# Patient Record
Sex: FEMALE | Race: WHITE
[De-identification: names, ages, dates, MRNs, and addresses within clinical notes are randomized per-mention and may not be internally consistent; named-entity substitution may affect disease eponyms.]

---

## 2020-05-21 ENCOUNTER — HOSPITAL ENCOUNTER (OUTPATIENT)
Age: 45
End: 2020-05-21
Payer: MEDICAID

## 2020-05-21 DIAGNOSIS — M54.9: Primary | ICD-10-CM

## 2020-05-21 PROCEDURE — 72100 X-RAY EXAM L-S SPINE 2/3 VWS: CPT

## 2023-03-15 PROBLEM — N32.81 OVERACTIVE BLADDER: Status: ACTIVE | Noted: 2023-03-15

## 2023-03-15 PROBLEM — M77.8 RIGHT ELBOW TENDINITIS: Status: ACTIVE | Noted: 2023-03-15

## 2023-03-15 PROBLEM — L30.9 VESICULAR FOOT ECZEMA: Status: ACTIVE | Noted: 2023-03-15

## 2023-03-15 PROBLEM — F41.0 GENERALIZED ANXIETY DISORDER WITH PANIC ATTACKS: Status: ACTIVE | Noted: 2023-03-15

## 2023-03-15 PROBLEM — I65.23 BILATERAL CAROTID ARTERY STENOSIS: Status: ACTIVE | Noted: 2023-03-15

## 2023-03-15 PROBLEM — E61.1 LOW IRON: Status: ACTIVE | Noted: 2023-03-15

## 2023-03-15 PROBLEM — F17.210 CIGARETTE NICOTINE DEPENDENCE: Status: ACTIVE | Noted: 2023-03-15

## 2023-03-15 PROBLEM — G89.29 CHRONIC BACK PAIN: Status: ACTIVE | Noted: 2023-03-15

## 2023-03-15 PROBLEM — E78.00 HYPERCHOLESTEROLEMIA: Status: ACTIVE | Noted: 2023-03-15

## 2023-03-15 PROBLEM — G43.909 MIGRAINE: Status: ACTIVE | Noted: 2023-03-15

## 2023-03-15 PROBLEM — F32.1 DEPRESSION, MAJOR, SINGLE EPISODE, MODERATE (MULTI): Status: ACTIVE | Noted: 2023-03-15

## 2023-03-15 PROBLEM — M51.36 OTHER INTERVERTEBRAL DISC DEGENERATION, LUMBAR REGION: Status: ACTIVE | Noted: 2023-03-15

## 2023-03-15 PROBLEM — M51.369 OTHER INTERVERTEBRAL DISC DEGENERATION, LUMBAR REGION: Status: ACTIVE | Noted: 2023-03-15

## 2023-03-15 PROBLEM — Z86.19 HISTORY OF SHINGLES: Status: ACTIVE | Noted: 2023-03-15

## 2023-03-15 PROBLEM — K21.9 GERD (GASTROESOPHAGEAL REFLUX DISEASE): Status: ACTIVE | Noted: 2023-03-15

## 2023-03-15 PROBLEM — M54.16 RADICULOPATHY, LUMBAR REGION: Status: ACTIVE | Noted: 2023-03-15

## 2023-03-15 PROBLEM — L30.9 HAND ECZEMA: Status: ACTIVE | Noted: 2023-03-15

## 2023-03-15 PROBLEM — M54.9 CHRONIC BACK PAIN: Status: ACTIVE | Noted: 2023-03-15

## 2023-03-15 PROBLEM — K22.70 BARRETT ESOPHAGUS: Status: ACTIVE | Noted: 2023-03-15

## 2023-03-15 PROBLEM — E53.8 FOLATE DEFICIENCY: Status: ACTIVE | Noted: 2023-03-15

## 2023-03-15 PROBLEM — F41.1 GENERALIZED ANXIETY DISORDER WITH PANIC ATTACKS: Status: ACTIVE | Noted: 2023-03-15

## 2023-03-15 PROBLEM — F60.3 BORDERLINE PERSONALITY DISORDER (MULTI): Status: ACTIVE | Noted: 2023-03-15

## 2023-03-15 PROBLEM — E53.8 VITAMIN B12 DEFICIENCY: Status: ACTIVE | Noted: 2023-03-15

## 2023-03-15 PROBLEM — K27.9 PEPTIC ULCER: Status: ACTIVE | Noted: 2023-03-15

## 2023-03-15 RX ORDER — GABAPENTIN 600 MG/1
600 TABLET ORAL
COMMUNITY

## 2023-03-15 RX ORDER — OMEPRAZOLE 40 MG/1
1 CAPSULE, DELAYED RELEASE ORAL DAILY
COMMUNITY
Start: 2020-11-05 | End: 2023-11-03

## 2023-03-15 RX ORDER — OXYBUTYNIN CHLORIDE 10 MG/1
1 TABLET, EXTENDED RELEASE ORAL DAILY
COMMUNITY
Start: 2022-04-07 | End: 2023-03-16 | Stop reason: ALTCHOICE

## 2023-03-15 RX ORDER — BUPROPION HYDROCHLORIDE 150 MG/1
1 TABLET, FILM COATED, EXTENDED RELEASE ORAL EVERY 12 HOURS
COMMUNITY
Start: 2021-02-10 | End: 2023-05-17

## 2023-03-15 RX ORDER — AMITRIPTYLINE HYDROCHLORIDE 50 MG/1
1 TABLET, FILM COATED ORAL NIGHTLY
COMMUNITY
End: 2023-11-07 | Stop reason: SDUPTHER

## 2023-03-15 RX ORDER — LANOLIN ALCOHOL/MO/W.PET/CERES
1 CREAM (GRAM) TOPICAL EVERY OTHER DAY
COMMUNITY
Start: 2019-10-25 | End: 2023-05-04 | Stop reason: SDUPTHER

## 2023-03-15 RX ORDER — VENLAFAXINE HYDROCHLORIDE 150 MG/1
150 CAPSULE, EXTENDED RELEASE ORAL
COMMUNITY
End: 2023-04-18

## 2023-03-15 RX ORDER — CLOTRIMAZOLE AND BETAMETHASONE DIPROPIONATE 10; .64 MG/G; MG/G
1 CREAM TOPICAL 2 TIMES DAILY
COMMUNITY
Start: 2021-12-08

## 2023-03-15 RX ORDER — ATORVASTATIN CALCIUM 20 MG/1
1 TABLET, FILM COATED ORAL NIGHTLY
COMMUNITY
Start: 2022-01-12 | End: 2023-05-04 | Stop reason: ALTCHOICE

## 2023-03-15 RX ORDER — AMMONIUM LACTATE 12 G/100G
LOTION TOPICAL 2 TIMES DAILY
COMMUNITY
Start: 2022-11-03

## 2023-03-15 RX ORDER — FOLIC ACID 1 MG/1
1 TABLET ORAL DAILY
COMMUNITY
Start: 2020-09-02 | End: 2023-05-04 | Stop reason: SDUPTHER

## 2023-03-15 RX ORDER — TIZANIDINE 2 MG/1
2 TABLET ORAL EVERY 8 HOURS
COMMUNITY
End: 2023-09-13 | Stop reason: ALTCHOICE

## 2023-03-15 RX ORDER — FERROUS SULFATE 325(65) MG
1 TABLET ORAL DAILY
COMMUNITY
Start: 2021-02-10 | End: 2023-05-04 | Stop reason: SDUPTHER

## 2023-03-15 RX ORDER — SUMATRIPTAN 50 MG/1
50 TABLET, FILM COATED ORAL ONCE AS NEEDED
COMMUNITY
End: 2023-07-11

## 2023-03-15 RX ORDER — MECLIZINE HYDROCHLORIDE 25 MG/1
1 TABLET ORAL 3 TIMES DAILY PRN
COMMUNITY
Start: 2022-01-13 | End: 2023-07-11

## 2023-03-15 RX ORDER — ALBUTEROL SULFATE 90 UG/1
2 AEROSOL, METERED RESPIRATORY (INHALATION) EVERY 6 HOURS PRN
COMMUNITY
Start: 2019-10-25 | End: 2023-07-11 | Stop reason: SDUPTHER

## 2023-03-15 RX ORDER — ARIPIPRAZOLE 10 MG/1
1 TABLET ORAL DAILY
COMMUNITY
Start: 2020-09-02 | End: 2023-04-13

## 2023-03-16 ENCOUNTER — OFFICE VISIT (OUTPATIENT)
Dept: PRIMARY CARE | Facility: CLINIC | Age: 48
End: 2023-03-16
Payer: COMMERCIAL

## 2023-03-16 VITALS
HEIGHT: 66 IN | WEIGHT: 187 LBS | DIASTOLIC BLOOD PRESSURE: 72 MMHG | HEART RATE: 84 BPM | BODY MASS INDEX: 30.05 KG/M2 | SYSTOLIC BLOOD PRESSURE: 114 MMHG

## 2023-03-16 DIAGNOSIS — R39.11 URINARY HESITANCY: Primary | ICD-10-CM

## 2023-03-16 DIAGNOSIS — L02.92 BOILS: ICD-10-CM

## 2023-03-16 LAB
POC APPEARANCE, URINE: CLEAR
POC BILIRUBIN, URINE: NEGATIVE
POC BLOOD, URINE: NEGATIVE
POC COLOR, URINE: YELLOW
POC GLUCOSE, URINE: NEGATIVE MG/DL
POC KETONES, URINE: NEGATIVE MG/DL
POC LEUKOCYTES, URINE: NEGATIVE
POC NITRITE,URINE: NEGATIVE
POC PH, URINE: 5.5 PH
POC PROTEIN, URINE: NEGATIVE MG/DL
POC SPECIFIC GRAVITY, URINE: >=1.03
POC UROBILINOGEN, URINE: 0.2 EU/DL

## 2023-03-16 PROCEDURE — 87086 URINE CULTURE/COLONY COUNT: CPT

## 2023-03-16 PROCEDURE — 99214 OFFICE O/P EST MOD 30 MIN: CPT | Performed by: NURSE PRACTITIONER

## 2023-03-16 PROCEDURE — 87491 CHLMYD TRACH DNA AMP PROBE: CPT

## 2023-03-16 PROCEDURE — 81003 URINALYSIS AUTO W/O SCOPE: CPT | Performed by: NURSE PRACTITIONER

## 2023-03-16 PROCEDURE — 4004F PT TOBACCO SCREEN RCVD TLK: CPT | Performed by: NURSE PRACTITIONER

## 2023-03-16 RX ORDER — CIPROFLOXACIN 250 MG/1
250 TABLET, FILM COATED ORAL 2 TIMES DAILY
Qty: 14 TABLET | Refills: 0 | Status: SHIPPED | OUTPATIENT
Start: 2023-03-16 | End: 2023-03-16 | Stop reason: ALTCHOICE

## 2023-03-16 RX ORDER — PHENAZOPYRIDINE HYDROCHLORIDE 100 MG/1
100 TABLET, FILM COATED ORAL 3 TIMES DAILY PRN
Qty: 10 TABLET | Refills: 0 | Status: SHIPPED | OUTPATIENT
Start: 2023-03-16 | End: 2023-03-19

## 2023-03-16 RX ORDER — DOXYCYCLINE 100 MG/1
100 TABLET ORAL 2 TIMES DAILY
Qty: 20 TABLET | Refills: 0 | Status: SHIPPED | OUTPATIENT
Start: 2023-03-16 | End: 2023-03-26

## 2023-03-16 ASSESSMENT — ENCOUNTER SYMPTOMS
SPEECH DIFFICULTY: 0
FREQUENCY: 0
NERVOUS/ANXIOUS: 0
CONSTIPATION: 0
UNEXPECTED WEIGHT CHANGE: 0
FATIGUE: 0
NUMBNESS: 0
WOUND: 0
SLEEP DISTURBANCE: 0
TROUBLE SWALLOWING: 0
PALPITATIONS: 0
APNEA: 0
JOINT SWELLING: 0
BACK PAIN: 0
BLOOD IN STOOL: 0
CONFUSION: 0
CHILLS: 0
CHOKING: 0
EYE PAIN: 0
NECK PAIN: 0
DIARRHEA: 0
HEADACHES: 0
SHORTNESS OF BREATH: 0
ARTHRALGIAS: 0
DIZZINESS: 0
PHOTOPHOBIA: 0
CHEST TIGHTNESS: 0
ABDOMINAL DISTENTION: 0
MYALGIAS: 0
VOMITING: 0
ABDOMINAL PAIN: 0
SEIZURES: 0
WHEEZING: 0
SORE THROAT: 0
DIFFICULTY URINATING: 1
LIGHT-HEADEDNESS: 0
DYSURIA: 0
WEAKNESS: 0
FLANK PAIN: 0
FEVER: 0
NAUSEA: 0
EYE REDNESS: 0
HEMATURIA: 0
FACIAL ASYMMETRY: 0
COUGH: 0

## 2023-03-16 ASSESSMENT — PATIENT HEALTH QUESTIONNAIRE - PHQ9
1. LITTLE INTEREST OR PLEASURE IN DOING THINGS: NOT AT ALL
SUM OF ALL RESPONSES TO PHQ9 QUESTIONS 1 AND 2: 0
2. FEELING DOWN, DEPRESSED OR HOPELESS: NOT AT ALL

## 2023-03-16 NOTE — PROGRESS NOTES
"Subjective   Patient ID: Africa Gastelum is a 47 y.o. female who presents for Urinary Problem (C/O TROUBLE URINATING X 2 MONTHS - PROGRESSIVELY GETTING WORSE. TO THE POINT NOW THAT SHE HAS TO PUSH IN ORDER TO URINATE. ).    HPI:  Presents today for C/O URINARY HESITANCY X 2 MONTHS THAT HAS GOTTEN WORSE   modifying factors consists of 2011 SHE DID HAVE RENAL STONES  associated symptoms consist of NO FLANK PAIN OR HEMATURIA  prior treatment consists of medication NONE    C/O MULTIPLE BOILS THAT COMES AND GOES X SEVERAL MONTHS    modifying factors consists of   ER BOYFRIEND ALSO GETS SIMILAR BOILS   associated symptoms consist of  PAINFUL. WILL HEAL ON THERE OWN  prior treatment consists of medication NONE    Visit Vitals  /72   Pulse 84   Ht 1.664 m (5' 5.5\")   Wt 84.8 kg (187 lb)   BMI 30.65 kg/m²   Smoking Status Every Day   BSA 1.98 m²        Review of Systems   Constitutional:  Negative for chills, fatigue, fever and unexpected weight change.   HENT:  Negative for congestion, ear pain, sore throat and trouble swallowing.    Eyes:  Negative for photophobia, pain, redness and visual disturbance.   Respiratory:  Negative for apnea, cough, choking, chest tightness, shortness of breath and wheezing.    Cardiovascular:  Negative for chest pain, palpitations and leg swelling.   Gastrointestinal:  Negative for abdominal distention, abdominal pain, blood in stool, constipation, diarrhea, nausea and vomiting.   Genitourinary:  Positive for difficulty urinating. Negative for dysuria, flank pain, frequency, hematuria and urgency.   Musculoskeletal:  Negative for arthralgias, back pain, gait problem, joint swelling, myalgias and neck pain.   Skin:  Negative for rash and wound.   Neurological:  Negative for dizziness, seizures, syncope, facial asymmetry, speech difficulty, weakness, light-headedness, numbness and headaches.   Psychiatric/Behavioral:  Negative for confusion, sleep disturbance and suicidal ideas. The " patient is not nervous/anxious.        Objective   Results  POCT UA Automated manually resulted (Order [id number])  POCT UA Automated manually resulted  Order: [id number]  Status: Final result       Visible to patient: No (inaccessible in [facility])       Next appt: 05/04/2023 at 07:40 AM in Primary Care (Divina Zepeda, APRN-CNP)       Dx: Urinary hesitancy    0 Result Notes       Component Ref Range & Units 16:55   POC Color, Urine Straw, Yellow, Light Yellow Yellow   POC Appearance, Urine Clear Clear   POC Specific Gravity, Urine 1.005 - 1.035 >=1.030   POC PH, Urine No Reference Range Established PH 5.5   POC Protein, Urine NEGATIVE, 30 (1+) mg/dl NEGATIVE   POC Glucose, Urine NEGATIVE mg/dl NEGATIVE   POC Blood, Urine NEGATIVE NEGATIVE   POC Ketones, Urine NEGATIVE mg/dl NEGATIVE   POC Bilirubin, Urine NEGATIVE NEGATIVE   POC Urobilinogen, Urine 0.2, 1.0 EU/DL 0.2   Poc Nitrate, Urine NEGATIVE NEGATIVE   POC Leukocytes, Urine NEGATIVE NEGATIVE                   Physical Exam  Constitutional:       Appearance: Normal appearance. She is normal weight.   HENT:      Head: Normocephalic.   Eyes:      Extraocular Movements: Extraocular movements intact.      Conjunctiva/sclera: Conjunctivae normal.      Pupils: Pupils are equal, round, and reactive to light.   Cardiovascular:      Rate and Rhythm: Normal rate and regular rhythm.      Pulses: Normal pulses.      Heart sounds: Normal heart sounds.   Pulmonary:      Effort: Pulmonary effort is normal.      Breath sounds: Normal breath sounds.   Abdominal:      General: Bowel sounds are normal.      Palpations: Abdomen is soft.   Musculoskeletal:         General: Normal range of motion.      Cervical back: Normal range of motion.   Skin:     General: Skin is warm and dry.   Neurological:      General: No focal deficit present.      Mental Status: She is alert and oriented to person, place, and time.   Psychiatric:         Mood and Affect: Mood normal.         Behavior:  Behavior normal.         Thought Content: Thought content normal.         Judgment: Judgment normal.            Assessment/Plan   Problem List Items Addressed This Visit          Infectious/Inflammatory    Boils    Relevant Medications    doxycycline (Adoxa) 100 mg tablet       Other    Urinary hesitancy - Primary    Relevant Medications    ciprofloxacin (Cipro) 250 mg tablet    phenazopyridine (Pyridium) 100 mg tablet    Other Relevant Orders    POCT UA Automated manually resulted (Completed)    Urine Culture    US renal complete    Chlamydia Trachomatis, Amplified       WE DISCUSSED MOST COMMON SIDE EFFECTS OF PRESCRIBED MEDICATIONS. INDICATIONS, RISK, COMPLICATIONS, AND ALTERNATIVES OF MEDICATION/THERAPEUTICS WERE EXPLAINED AND DISCUSSED. PLEASE MONITOR CLOSELY FOR ANY UNTOWARD SIDE EFFECTS OR COMPLICATIONS OF MEDICATIONS. PATIENT IS STRONGLY ADVISED TO BE COMPLIANT WITH RECOMMENDATIONS. QUESTIONS AND CONCERNS WERE ADDRESSED. INSTRUCTED TO CALL, RETURN SOONER, OR GO TO THE ER,  IF SYMPTOMS PERSIST OR WORSEN. THEY VOICED UNDERSTANDING AND  DENIES FURTHER QUESTIONS AT THIS TIME.    TIME CODE  1. PREPARATION FOR PATIENT'S VISIT (REVIEWING CHART, CURRENT MEDICAL RECORDS, OUTSIDE HEALTH PROVIDER RECORDS, PREVIOUS HISTORY, EXAM, TEST, PROCEDURE, AND MEDICATIONS)  2. FACE TO FACE ENCOUNTER OBTAINING HISTORY FROM THE PATIENT/FAMILY/CAREGIVERS; PERFORMING EVALUATION AND EXAMINATION; ORDERING TESTS OR PROCEDURES; REFERRING AND COMMUNICATING WITH OTHER HEALTHCARE PROVIDERS; COUNSELING AND EDUCATION OF THE PATIENT/FAMILY/CAREGIVERS; INDEPENDENTLY INTERPRETING RESULTS (TESTS, LABS, PROCEDURES, IMAGING) AND COMMUNICATING AND EXPLAINING RESULTS TO THE PATIENT/FAMILY/CAREGIVERS  3. COORDINATION OF CARE; PREPARING AND PRINTING DISCHARGE INSTRUCTIONS AND ANY EDUCATIONAL MATERIAL FOR THE PATIENT/FAMILY/CAREGIVERS. DOCUMENTING CLINICAL INFORMATION IN THE ELECTRONIC MEDICAL RECORD   4. REVIEWING OARRS AS NEEDED    MDM  1) COMPLEXITY:  MORE THAN 1 STABLE CHRONIC CONDITION ADDRESSED OR 1 ACUTE ILLNESS ADDRESSED   2)DATA: TESTS INTERPRETED AND OR ORDERED, TOOK INDEPENDENT HISTORY OR RECORDS REVIEWED  3)RISK: MODERATE RISK DUE TO NATURE OF MEDICAL CONDITIONS/COMORBIDITY OR MEDICATIONS ORDERED OR SURGICAL OR PROCEDURE REFERRAL    As before per pt request. Renal us

## 2023-03-17 LAB — CHLAMYDIA TRACH., AMPLIFIED: NEGATIVE

## 2023-03-19 LAB — URINE CULTURE: NORMAL

## 2023-04-12 DIAGNOSIS — F32.1 MAJOR DEPRESSIVE DISORDER, SINGLE EPISODE, MODERATE (MULTI): ICD-10-CM

## 2023-04-13 RX ORDER — ARIPIPRAZOLE 10 MG/1
TABLET ORAL
Qty: 90 TABLET | Refills: 1 | Status: SHIPPED | OUTPATIENT
Start: 2023-04-13 | End: 2023-10-11

## 2023-04-16 DIAGNOSIS — F41.9 ANXIETY DISORDER, UNSPECIFIED: ICD-10-CM

## 2023-04-16 DIAGNOSIS — F32.A DEPRESSION, UNSPECIFIED: ICD-10-CM

## 2023-04-18 RX ORDER — VENLAFAXINE HYDROCHLORIDE 150 MG/1
CAPSULE, EXTENDED RELEASE ORAL
Qty: 90 CAPSULE | Refills: 1 | Status: SHIPPED | OUTPATIENT
Start: 2023-04-18 | End: 2023-08-01

## 2023-05-02 LAB
ALANINE AMINOTRANSFERASE (SGPT) (U/L) IN SER/PLAS: 14 U/L (ref 7–45)
ALBUMIN (G/DL) IN SER/PLAS: 3.9 G/DL (ref 3.4–5)
ALKALINE PHOSPHATASE (U/L) IN SER/PLAS: 107 U/L (ref 33–110)
ANION GAP IN SER/PLAS: 9 MMOL/L (ref 10–20)
ASPARTATE AMINOTRANSFERASE (SGOT) (U/L) IN SER/PLAS: 15 U/L (ref 9–39)
BASOPHILS (10*3/UL) IN BLOOD BY AUTOMATED COUNT: 0.04 X10E9/L (ref 0–0.1)
BASOPHILS/100 LEUKOCYTES IN BLOOD BY AUTOMATED COUNT: 0.6 % (ref 0–2)
BILIRUBIN TOTAL (MG/DL) IN SER/PLAS: 0.6 MG/DL (ref 0–1.2)
CALCIDIOL (25 OH VITAMIN D3) (NG/ML) IN SER/PLAS: 16 NG/ML
CALCIUM (MG/DL) IN SER/PLAS: 9 MG/DL (ref 8.6–10.3)
CARBON DIOXIDE, TOTAL (MMOL/L) IN SER/PLAS: 28 MMOL/L (ref 21–32)
CHLORIDE (MMOL/L) IN SER/PLAS: 103 MMOL/L (ref 98–107)
CHOLESTEROL (MG/DL) IN SER/PLAS: 216 MG/DL (ref 0–199)
CHOLESTEROL IN HDL (MG/DL) IN SER/PLAS: 35 MG/DL
CHOLESTEROL/HDL RATIO: 6.2
COBALAMIN (VITAMIN B12) (PG/ML) IN SER/PLAS: 330 PG/ML (ref 211–911)
CREATININE (MG/DL) IN SER/PLAS: 1.04 MG/DL (ref 0.5–1.05)
EOSINOPHILS (10*3/UL) IN BLOOD BY AUTOMATED COUNT: 0.11 X10E9/L (ref 0–0.7)
EOSINOPHILS/100 LEUKOCYTES IN BLOOD BY AUTOMATED COUNT: 1.6 % (ref 0–6)
ERYTHROCYTE DISTRIBUTION WIDTH (RATIO) BY AUTOMATED COUNT: 13.2 % (ref 11.5–14.5)
ERYTHROCYTE MEAN CORPUSCULAR HEMOGLOBIN CONCENTRATION (G/DL) BY AUTOMATED: 33.1 G/DL (ref 32–36)
ERYTHROCYTE MEAN CORPUSCULAR VOLUME (FL) BY AUTOMATED COUNT: 92 FL (ref 80–100)
ERYTHROCYTES (10*6/UL) IN BLOOD BY AUTOMATED COUNT: 5.48 X10E12/L (ref 4–5.2)
ESTIMATED AVERAGE GLUCOSE FOR HBA1C: 97 MG/DL
FERRITIN (UG/LL) IN SER/PLAS: 146 UG/L (ref 8–150)
FOLATE (NG/ML) IN SER/PLAS: >22.3 NG/ML
GFR FEMALE: 67 ML/MIN/1.73M2
GLUCOSE (MG/DL) IN SER/PLAS: 91 MG/DL (ref 74–99)
HEMATOCRIT (%) IN BLOOD BY AUTOMATED COUNT: 50.2 % (ref 36–46)
HEMOGLOBIN (G/DL) IN BLOOD: 16.6 G/DL (ref 12–16)
HEMOGLOBIN A1C/HEMOGLOBIN TOTAL IN BLOOD: 5 %
IMMATURE GRANULOCYTES/100 LEUKOCYTES IN BLOOD BY AUTOMATED COUNT: 0.4 % (ref 0–0.9)
IRON (UG/DL) IN SER/PLAS: 112 UG/DL (ref 35–150)
IRON BINDING CAPACITY (UG/DL) IN SER/PLAS: 256 UG/DL (ref 240–445)
IRON SATURATION (%) IN SER/PLAS: 44 % (ref 25–45)
LDL: 149 MG/DL (ref 0–99)
LEUKOCYTES (10*3/UL) IN BLOOD BY AUTOMATED COUNT: 6.9 X10E9/L (ref 4.4–11.3)
LYMPHOCYTES (10*3/UL) IN BLOOD BY AUTOMATED COUNT: 1.81 X10E9/L (ref 1.2–4.8)
LYMPHOCYTES/100 LEUKOCYTES IN BLOOD BY AUTOMATED COUNT: 26.4 % (ref 13–44)
MONOCYTES (10*3/UL) IN BLOOD BY AUTOMATED COUNT: 0.41 X10E9/L (ref 0.1–1)
MONOCYTES/100 LEUKOCYTES IN BLOOD BY AUTOMATED COUNT: 6 % (ref 2–10)
NEUTROPHILS (10*3/UL) IN BLOOD BY AUTOMATED COUNT: 4.46 X10E9/L (ref 1.2–7.7)
NEUTROPHILS/100 LEUKOCYTES IN BLOOD BY AUTOMATED COUNT: 65 % (ref 40–80)
PLATELETS (10*3/UL) IN BLOOD AUTOMATED COUNT: 143 X10E9/L (ref 150–450)
POTASSIUM (MMOL/L) IN SER/PLAS: 3.7 MMOL/L (ref 3.5–5.3)
PROTEIN TOTAL: 7 G/DL (ref 6.4–8.2)
SODIUM (MMOL/L) IN SER/PLAS: 136 MMOL/L (ref 136–145)
THYROTROPIN (MIU/L) IN SER/PLAS BY DETECTION LIMIT <= 0.05 MIU/L: 1.97 MIU/L (ref 0.44–3.98)
TRIGLYCERIDE (MG/DL) IN SER/PLAS: 162 MG/DL (ref 0–149)
UREA NITROGEN (MG/DL) IN SER/PLAS: 10 MG/DL (ref 6–23)
VLDL: 32 MG/DL (ref 0–40)

## 2023-05-04 ENCOUNTER — OFFICE VISIT (OUTPATIENT)
Dept: PRIMARY CARE | Facility: CLINIC | Age: 48
End: 2023-05-04
Payer: COMMERCIAL

## 2023-05-04 VITALS
WEIGHT: 184 LBS | HEART RATE: 77 BPM | DIASTOLIC BLOOD PRESSURE: 81 MMHG | HEIGHT: 65 IN | BODY MASS INDEX: 30.66 KG/M2 | SYSTOLIC BLOOD PRESSURE: 123 MMHG

## 2023-05-04 DIAGNOSIS — F32.1 DEPRESSION, MAJOR, SINGLE EPISODE, MODERATE (MULTI): ICD-10-CM

## 2023-05-04 DIAGNOSIS — Z12.11 COLON CANCER SCREENING: ICD-10-CM

## 2023-05-04 DIAGNOSIS — E55.9 VITAMIN D DEFICIENCY: ICD-10-CM

## 2023-05-04 DIAGNOSIS — F41.0 GENERALIZED ANXIETY DISORDER WITH PANIC ATTACKS: ICD-10-CM

## 2023-05-04 DIAGNOSIS — E78.00 HYPERCHOLESTEROLEMIA: Primary | ICD-10-CM

## 2023-05-04 DIAGNOSIS — E61.1 LOW IRON: ICD-10-CM

## 2023-05-04 DIAGNOSIS — E53.8 FOLATE DEFICIENCY: ICD-10-CM

## 2023-05-04 DIAGNOSIS — N18.2 CKD (CHRONIC KIDNEY DISEASE) STAGE 2, GFR 60-89 ML/MIN: ICD-10-CM

## 2023-05-04 DIAGNOSIS — F41.1 GENERALIZED ANXIETY DISORDER WITH PANIC ATTACKS: ICD-10-CM

## 2023-05-04 DIAGNOSIS — N32.81 OVERACTIVE BLADDER: ICD-10-CM

## 2023-05-04 DIAGNOSIS — E53.8 VITAMIN B12 DEFICIENCY: ICD-10-CM

## 2023-05-04 DIAGNOSIS — K22.719 BARRETT'S ESOPHAGUS WITH DYSPLASIA: ICD-10-CM

## 2023-05-04 DIAGNOSIS — K59.00 CONSTIPATION, UNSPECIFIED CONSTIPATION TYPE: ICD-10-CM

## 2023-05-04 PROCEDURE — 4004F PT TOBACCO SCREEN RCVD TLK: CPT | Performed by: NURSE PRACTITIONER

## 2023-05-04 PROCEDURE — 99214 OFFICE O/P EST MOD 30 MIN: CPT | Performed by: NURSE PRACTITIONER

## 2023-05-04 RX ORDER — ERGOCALCIFEROL 1.25 MG/1
50000 CAPSULE ORAL
Qty: 13 CAPSULE | Refills: 3 | Status: SHIPPED | OUTPATIENT
Start: 2023-05-04 | End: 2024-05-03

## 2023-05-04 RX ORDER — PANTOPRAZOLE SODIUM 40 MG/1
40 TABLET, DELAYED RELEASE ORAL
COMMUNITY
Start: 2017-07-24 | End: 2023-11-07 | Stop reason: SDUPTHER

## 2023-05-04 RX ORDER — FERROUS SULFATE 325(65) MG
1 TABLET ORAL DAILY
Qty: 90 TABLET | Refills: 3 | Status: SHIPPED | OUTPATIENT
Start: 2023-05-04 | End: 2024-05-28 | Stop reason: SDUPTHER

## 2023-05-04 RX ORDER — OXYBUTYNIN CHLORIDE 10 MG/1
10 TABLET, EXTENDED RELEASE ORAL DAILY
COMMUNITY
Start: 2023-04-17 | End: 2023-05-04 | Stop reason: SDUPTHER

## 2023-05-04 RX ORDER — DOCUSATE SODIUM 100 MG/1
100 CAPSULE, LIQUID FILLED ORAL 3 TIMES DAILY PRN
Qty: 90 CAPSULE | Refills: 3 | Status: SHIPPED | OUTPATIENT
Start: 2023-05-04 | End: 2023-09-13

## 2023-05-04 RX ORDER — BISACODYL 5 MG
TABLET, DELAYED RELEASE (ENTERIC COATED) ORAL
Qty: 30 TABLET | Refills: 0 | Status: SHIPPED | OUTPATIENT
Start: 2023-05-04 | End: 2023-11-07 | Stop reason: ALTCHOICE

## 2023-05-04 RX ORDER — FERROUS SULFATE, DRIED 160(50) MG
1 TABLET, EXTENDED RELEASE ORAL 2 TIMES DAILY
Qty: 180 TABLET | Refills: 3 | Status: SHIPPED | OUTPATIENT
Start: 2023-05-04 | End: 2024-05-28 | Stop reason: SDUPTHER

## 2023-05-04 RX ORDER — ATORVASTATIN CALCIUM 40 MG/1
40 TABLET, FILM COATED ORAL DAILY
Qty: 90 TABLET | Refills: 3 | Status: SHIPPED | OUTPATIENT
Start: 2023-05-04 | End: 2023-11-07 | Stop reason: SDUPTHER

## 2023-05-04 RX ORDER — POLYETHYLENE GLYCOL 3350 17 G/17G
POWDER, FOR SOLUTION ORAL
Qty: 51 G | Refills: 0 | Status: SHIPPED | OUTPATIENT
Start: 2023-05-04 | End: 2023-11-07 | Stop reason: SDUPTHER

## 2023-05-04 RX ORDER — OXYBUTYNIN CHLORIDE 10 MG/1
10 TABLET, EXTENDED RELEASE ORAL DAILY
Qty: 90 TABLET | Refills: 3 | Status: SHIPPED | OUTPATIENT
Start: 2023-05-04 | End: 2023-09-28 | Stop reason: ALTCHOICE

## 2023-05-04 RX ORDER — LANOLIN ALCOHOL/MO/W.PET/CERES
1000 CREAM (GRAM) TOPICAL EVERY OTHER DAY
Qty: 90 TABLET | Refills: 3 | Status: SHIPPED | OUTPATIENT
Start: 2023-05-04 | End: 2024-05-28 | Stop reason: SDUPTHER

## 2023-05-04 RX ORDER — FOLIC ACID 1 MG/1
1 TABLET ORAL DAILY
Qty: 90 TABLET | Refills: 3 | Status: SHIPPED | OUTPATIENT
Start: 2023-05-04 | End: 2024-05-28 | Stop reason: SDUPTHER

## 2023-05-04 ASSESSMENT — PATIENT HEALTH QUESTIONNAIRE - PHQ9
4. FEELING TIRED OR HAVING LITTLE ENERGY: NEARLY EVERY DAY
3. TROUBLE FALLING OR STAYING ASLEEP OR SLEEPING TOO MUCH: NEARLY EVERY DAY
10. IF YOU CHECKED OFF ANY PROBLEMS, HOW DIFFICULT HAVE THESE PROBLEMS MADE IT FOR YOU TO DO YOUR WORK, TAKE CARE OF THINGS AT HOME, OR GET ALONG WITH OTHER PEOPLE: VERY DIFFICULT
2. FEELING DOWN, DEPRESSED OR HOPELESS: NEARLY EVERY DAY
5. POOR APPETITE OR OVEREATING: NEARLY EVERY DAY
7. TROUBLE CONCENTRATING ON THINGS, SUCH AS READING THE NEWSPAPER OR WATCHING TELEVISION: NEARLY EVERY DAY
1. LITTLE INTEREST OR PLEASURE IN DOING THINGS: NEARLY EVERY DAY
8. MOVING OR SPEAKING SO SLOWLY THAT OTHER PEOPLE COULD HAVE NOTICED. OR THE OPPOSITE, BEING SO FIGETY OR RESTLESS THAT YOU HAVE BEEN MOVING AROUND A LOT MORE THAN USUAL: NOT AT ALL
6. FEELING BAD ABOUT YOURSELF - OR THAT YOU ARE A FAILURE OR HAVE LET YOURSELF OR YOUR FAMILY DOWN: NEARLY EVERY DAY
SUM OF ALL RESPONSES TO PHQ QUESTIONS 1-9: 21
9. THOUGHTS THAT YOU WOULD BE BETTER OFF DEAD, OR OF HURTING YOURSELF: NOT AT ALL
SUM OF ALL RESPONSES TO PHQ9 QUESTIONS 1 AND 2: 6

## 2023-05-04 ASSESSMENT — ENCOUNTER SYMPTOMS
EYE PAIN: 0
DYSURIA: 0
PHOTOPHOBIA: 0
TROUBLE SWALLOWING: 0
CHOKING: 0
NECK PAIN: 0
DIARRHEA: 0
HEMATURIA: 0
WHEEZING: 0
CHILLS: 0
FATIGUE: 0
SEIZURES: 0
DIZZINESS: 0
EYE REDNESS: 0
JOINT SWELLING: 0
CHEST TIGHTNESS: 0
CONSTIPATION: 0
SPEECH DIFFICULTY: 0
COUGH: 0
PALPITATIONS: 0
HEADACHES: 0
NAUSEA: 0
MYALGIAS: 0
NUMBNESS: 0
FREQUENCY: 0
FACIAL ASYMMETRY: 0
FEVER: 0
DIFFICULTY URINATING: 0
FLANK PAIN: 0
ABDOMINAL PAIN: 0
VOMITING: 0
APNEA: 0
CONFUSION: 0
SHORTNESS OF BREATH: 0
WOUND: 0
UNEXPECTED WEIGHT CHANGE: 0
SLEEP DISTURBANCE: 0
ABDOMINAL DISTENTION: 0
NERVOUS/ANXIOUS: 0
BLOOD IN STOOL: 0
BACK PAIN: 0
WEAKNESS: 0
ARTHRALGIAS: 0
SORE THROAT: 0

## 2023-05-04 NOTE — PROGRESS NOTES
"Subjective   Patient ID: Africa Gastelum is a 47 y.o. female who presents for Follow-up (6 MO F/U WITH LABS. NO COMPLAINTS).      HPI:  Presents today for 6 MONTH FU WITH [facility] LABS. C/O CONSTIPATION X SEVERAL MONTHS  modifying factors consists of ON IRON associated symptoms consist of AT TIMES SHE WILL NOT HAVE A BM FOR 2 WEEKS. ABD PAIN ON/OFF  prior treatment consists of medication NONE      VIT D- LOW. START CALCIUM WITH VIT D AND WEEKLY VIT D  OVERACTIVE BLADDER- STABLE. MED REFILL   B12- STABLE  IRON- STABLE  LIPIDS- INCREASE STATIN TO 40 MG FROM 20 MG  ANXIETY/DEPRESSION- STABLE    Visit Vitals  /81   Pulse 77   Ht 1.651 m (5' 5\")   Wt 83.5 kg (184 lb)   BMI 30.62 kg/m²   Smoking Status Every Day   BSA 1.96 m²        Review of Systems   Constitutional:  Negative for chills, fatigue, fever and unexpected weight change.   HENT:  Negative for congestion, ear pain, sore throat and trouble swallowing.    Eyes:  Negative for photophobia, pain, redness and visual disturbance.   Respiratory:  Negative for apnea, cough, choking, chest tightness, shortness of breath and wheezing.    Cardiovascular:  Negative for chest pain, palpitations and leg swelling.   Gastrointestinal:  Negative for abdominal distention, abdominal pain, blood in stool, constipation, diarrhea, nausea and vomiting.   Genitourinary:  Negative for difficulty urinating, dysuria, flank pain, frequency, hematuria and urgency.   Musculoskeletal:  Negative for arthralgias, back pain, gait problem, joint swelling, myalgias and neck pain.   Skin:  Negative for rash and wound.   Neurological:  Negative for dizziness, seizures, syncope, facial asymmetry, speech difficulty, weakness, numbness and headaches.   Psychiatric/Behavioral:  Negative for confusion, sleep disturbance and suicidal ideas. The patient is not nervous/anxious.        Objective   Component      Latest Ref Rng 5/2/2023   WBC      4.4 - 11.3 x10E9/L 6.9    RBC      4.00 - 5.20 x10E12/L " 5.48 (H)    HEMOGLOBIN      12.0 - 16.0 g/dL 16.6 (H)    HEMATOCRIT      36.0 - 46.0 % 50.2 (H)    MCV      80 - 100 fL 92    MCHC      32.0 - 36.0 g/dL 33.1    Platelets      150 - 450 x10E9/L 143 (L)    RED CELL DISTRIBUTION WIDTH      11.5 - 14.5 % 13.2    Neutrophils %      40.0 - 80.0 % 65.0    Immature Granulocytes %, Automated      0.0 - 0.9 % 0.4    Lymphocytes %      13.0 - 44.0 % 26.4    Monocytes %      2.0 - 10.0 % 6.0    Eosinophils %      0.0 - 6.0 % 1.6    Basophils %      0.0 - 2.0 % 0.6    Neutrophils Absolute      1.20 - 7.70 x10E9/L 4.46    Lymphocytes Absolute      1.20 - 4.80 x10E9/L 1.81    Monocytes Absolute      0.10 - 1.00 x10E9/L 0.41    Eosinophils Absolute      0.00 - 0.70 x10E9/L 0.11    Basophils Absolute      0.00 - 0.10 x10E9/L 0.04    GLUCOSE      74 - 99 mg/dL 91    SODIUM      136 - 145 mmol/L 136    POTASSIUM      3.5 - 5.3 mmol/L 3.7    CHLORIDE      98 - 107 mmol/L 103    Bicarbonate      21 - 32 mmol/L 28    Anion Gap      10 - 20 mmol/L 9 (L)    Blood Urea Nitrogen      6 - 23 mg/dL 10    Creatinine      0.50 - 1.05 mg/dL 1.04    GFR Female      >90 mL/min/1.73m2 67    Calcium      8.6 - 10.3 mg/dL 9.0    Albumin      3.4 - 5.0 g/dL 3.9    Alkaline Phosphatase      33 - 110 U/L 107    Total Protein      6.4 - 8.2 g/dL 7.0    AST      9 - 39 U/L 15    Bilirubin Total      0.0 - 1.2 mg/dL 0.6    ALT      7 - 45 U/L 14    CHOLESTEROL      0 - 199 mg/dL 216 (H)    HDL CHOLESTEROL      mg/dL 35.0 !    Cholesterol/HDL Ratio 6.2 !    LDL      0 - 99 mg/dL 149 (H)    VLDL      0 - 40 mg/dL 32    TRIGLYCERIDES      0 - 149 mg/dL 162 (H)    IRON      35 - 150 ug/dL 112    TIBC      240 - 445 ug/dL 256    % Saturation      25 - 45 % 44    Hemoglobin A1C      % 5.0    Estimated Average Glucose      MG/DL 97    FOLATE      >5.0 ng/mL >22.3    FERRITIN      8 - 150 ug/L 146    Vitamin B12      211 - 911 pg/mL 330    Thyroid Stimulating Hormone      0.44 - 3.98 mIU/L 1.97    Vitamin D,  25-Hydroxy, Total      ng/mL 16 !       Legend:  (H) High  (L) Low  ! Abnormal  Physical Exam  Constitutional:       Appearance: Normal appearance. She is normal weight.   HENT:      Head: Normocephalic.   Eyes:      Extraocular Movements: Extraocular movements intact.      Conjunctiva/sclera: Conjunctivae normal.      Pupils: Pupils are equal, round, and reactive to light.   Cardiovascular:      Rate and Rhythm: Normal rate and regular rhythm.      Pulses: Normal pulses.      Heart sounds: Normal heart sounds.   Pulmonary:      Effort: Pulmonary effort is normal.      Breath sounds: Normal breath sounds.   Musculoskeletal:         General: Normal range of motion.      Cervical back: Normal range of motion.   Skin:     General: Skin is warm and dry.   Neurological:      General: No focal deficit present.      Mental Status: She is alert and oriented to person, place, and time.   Psychiatric:         Mood and Affect: Mood normal.         Behavior: Behavior normal.         Thought Content: Thought content normal.         Judgment: Judgment normal.            Assessment/Plan   Problem List Items Addressed This Visit          Digestive    Low esophagus    Constipation    Relevant Medications    docusate sodium (Colace) 100 mg capsule       Genitourinary    Overactive bladder    Relevant Medications    oxybutynin XL (Ditropan-XL) 10 mg 24 hr tablet       Endocrine/Metabolic    Folate deficiency    Relevant Medications    folic acid (Folvite) 1 mg tablet    Other Relevant Orders    Folate    Vitamin B12 deficiency    Relevant Medications    cyanocobalamin (Vitamin B-12) 1,000 mcg tablet    Other Relevant Orders    Vitamin B12    Vitamin D deficiency    Relevant Medications    calcium carbonate-vitamin D3 (Hi-Alek Plus Vit D) 500 mg-5 mcg (200 unit) tablet    ergocalciferol (Vitamin D-2) 1.25 MG (18279 UT) capsule    Other Relevant Orders    Parathyroid Hormone, Intact    Vitamin D, Total       Other    Depression,  major, single episode, moderate (CMS/HCC)    Generalized anxiety disorder with panic attacks    Hypercholesterolemia - Primary    Relevant Medications    atorvastatin (Lipitor) 40 mg tablet    bisacodyl (Dulcolax, bisacodyl,) 5 mg EC tablet    polyethylene glycol (Miralax) 17 gram/dose powder    Other Relevant Orders    Hemoglobin A1C    TSH with reflex to Free T4 if abnormal    Low iron    Relevant Medications    ferrous sulfate 325 (65 Fe) MG tablet    Other Relevant Orders    Ferritin    Iron and TIBC    Colon cancer screening    Relevant Orders    Cologuard® colon cancer screening     Other Visit Diagnoses       CKD (chronic kidney disease) stage 2, GFR 60-89 ml/min        Relevant Orders    CBC and Auto Differential    Comprehensive Metabolic Panel            WE DISCUSSED MOST COMMON SIDE EFFECTS OF PRESCRIBED MEDICATIONS. INDICATIONS, RISK, COMPLICATIONS, AND ALTERNATIVES OF MEDICATION/THERAPEUTICS WERE EXPLAINED AND DISCUSSED. PLEASE MONITOR CLOSELY FOR ANY UNTOWARD SIDE EFFECTS OR COMPLICATIONS OF MEDICATIONS. PATIENT IS STRONGLY ADVISED TO BE COMPLIANT WITH RECOMMENDATIONS. QUESTIONS AND CONCERNS WERE ADDRESSED. INSTRUCTED TO CALL, RETURN SOONER, OR GO TO THE ER,  IF SYMPTOMS PERSIST OR WORSEN. THEY VOICED UNDERSTANDING AND  DENIES FURTHER QUESTIONS AT THIS TIME.    TIME CODE  1. PREPARATION FOR PATIENT'S VISIT (REVIEWING CHART, CURRENT MEDICAL RECORDS, OUTSIDE HEALTH PROVIDER RECORDS, PREVIOUS HISTORY, EXAM, TEST, PROCEDURE, AND MEDICATIONS)  2. FACE TO FACE ENCOUNTER OBTAINING HISTORY FROM THE PATIENT/FAMILY/CAREGIVERS; PERFORMING EVALUATION AND EXAMINATION; ORDERING TESTS OR PROCEDURES; REFERRING AND COMMUNICATING WITH OTHER HEALTHCARE PROVIDERS; COUNSELING AND EDUCATION OF THE PATIENT/FAMILY/CAREGIVERS; INDEPENDENTLY INTERPRETING RESULTS (TESTS, LABS, PROCEDURES, IMAGING) AND COMMUNICATING AND EXPLAINING RESULTS TO THE PATIENT/FAMILY/CAREGIVERS  3. COORDINATION OF CARE; PREPARING AND PRINTING DISCHARGE  INSTRUCTIONS AND ANY EDUCATIONAL MATERIAL FOR THE PATIENT/FAMILY/CAREGIVERS. DOCUMENTING CLINICAL INFORMATION IN THE ELECTRONIC MEDICAL RECORD   4. REVIEWING OARRS AS NEEDED    MDM  1) COMPLEXITY: MORE THAN 1 STABLE CHRONIC CONDITION ADDRESSED OR 1 ACUTE ILLNESS ADDRESSED   2)DATA: TESTS INTERPRETED AND OR ORDERED, TOOK INDEPENDENT HISTORY OR RECORDS REVIEWED  3)RISK: MODERATE RISK DUE TO NATURE OF MEDICAL CONDITIONS/COMORBIDITY OR MEDICATIONS ORDERED OR SURGICAL OR PROCEDURE REFERRAL    6 MONTHS WITH LABS     COLOGUARD

## 2023-05-17 DIAGNOSIS — F17.200 NICOTINE DEPENDENCE, UNSPECIFIED, UNCOMPLICATED: ICD-10-CM

## 2023-05-17 RX ORDER — BUPROPION HYDROCHLORIDE 150 MG/1
TABLET, FILM COATED, EXTENDED RELEASE ORAL
Qty: 60 TABLET | Refills: 5 | Status: SHIPPED | OUTPATIENT
Start: 2023-05-17 | End: 2023-12-14

## 2023-05-31 LAB — NONINV COLON CA DNA+OCC BLD SCRN STL QL: POSITIVE

## 2023-06-01 ENCOUNTER — TELEPHONE (OUTPATIENT)
Dept: PRIMARY CARE | Facility: CLINIC | Age: 48
End: 2023-06-01
Payer: COMMERCIAL

## 2023-06-01 DIAGNOSIS — R19.5 POSITIVE COLORECTAL CANCER SCREENING USING COLOGUARD TEST: Primary | ICD-10-CM

## 2023-06-01 NOTE — TELEPHONE ENCOUNTER
----- Message from RUFINO Santana sent at 6/1/2023  7:55 AM EDT -----  COLOGUARD IS POSITIVE. SHE NEEDS A COLON FOR FURTHER EVALUATION. ORDER IS IN  ----- Message -----  From: [employer] - Lab Results In  Sent: 5/31/2023   4:46 PM EDT  To: RUFINO Santana

## 2023-06-29 ENCOUNTER — TELEPHONE (OUTPATIENT)
Dept: PRIMARY CARE | Facility: CLINIC | Age: 48
End: 2023-06-29
Payer: COMMERCIAL

## 2023-06-29 ENCOUNTER — HOSPITAL ENCOUNTER (OUTPATIENT)
Dept: DATA CONVERSION | Facility: HOSPITAL | Age: 48
End: 2023-06-29
Attending: INTERNAL MEDICINE | Admitting: INTERNAL MEDICINE
Payer: COMMERCIAL

## 2023-06-29 DIAGNOSIS — Z12.11 ENCOUNTER FOR SCREENING FOR MALIGNANT NEOPLASM OF COLON: ICD-10-CM

## 2023-06-29 DIAGNOSIS — D12.5 BENIGN NEOPLASM OF SIGMOID COLON: ICD-10-CM

## 2023-06-29 DIAGNOSIS — D12.2 BENIGN NEOPLASM OF ASCENDING COLON: ICD-10-CM

## 2023-06-29 DIAGNOSIS — R19.5 OTHER FECAL ABNORMALITIES: ICD-10-CM

## 2023-07-06 LAB
COMPLETE PATHOLOGY REPORT: NORMAL
CONVERTED CLINICAL DIAGNOSIS-HISTORY: NORMAL
CONVERTED FINAL DIAGNOSIS: NORMAL
CONVERTED FINAL REPORT PDF LINK TO COPY AND PASTE: NORMAL
CONVERTED GROSS DESCRIPTION: NORMAL

## 2023-07-10 DIAGNOSIS — R06.02 SHORTNESS OF BREATH ON EXERTION: ICD-10-CM

## 2023-07-10 NOTE — TELEPHONE ENCOUNTER
PT HAD A COLONOSCOPY DONE A FEW WEEKS AGO AND SHE CALLED IN WANTING TO KNOW THE RESULTS? PLEASE REVIEW AND ADVISE.

## 2023-07-11 DIAGNOSIS — G43.909 MIGRAINE, UNSPECIFIED, NOT INTRACTABLE, WITHOUT STATUS MIGRAINOSUS: ICD-10-CM

## 2023-07-11 DIAGNOSIS — Z87.898 PERSONAL HISTORY OF OTHER SPECIFIED CONDITIONS: ICD-10-CM

## 2023-07-11 PROBLEM — M54.30 SCIATICA: Status: ACTIVE | Noted: 2023-07-11

## 2023-07-11 PROBLEM — R55 SYNCOPAL EPISODES: Status: ACTIVE | Noted: 2023-07-11

## 2023-07-11 PROBLEM — F32.A DEPRESSION: Status: ACTIVE | Noted: 2023-07-11

## 2023-07-11 RX ORDER — MECLIZINE HYDROCHLORIDE 25 MG/1
TABLET ORAL
Qty: 90 TABLET | Refills: 1 | Status: SHIPPED | OUTPATIENT
Start: 2023-07-11 | End: 2023-09-13

## 2023-07-11 RX ORDER — SUMATRIPTAN 50 MG/1
TABLET, FILM COATED ORAL
Qty: 9 TABLET | Refills: 1 | Status: SHIPPED | OUTPATIENT
Start: 2023-07-11 | End: 2023-09-12

## 2023-07-11 RX ORDER — ALBUTEROL SULFATE 90 UG/1
2 AEROSOL, METERED RESPIRATORY (INHALATION) EVERY 6 HOURS PRN
Qty: 18 G | Refills: 11 | Status: SHIPPED | OUTPATIENT
Start: 2023-07-11

## 2023-08-01 DIAGNOSIS — F32.A DEPRESSION, UNSPECIFIED: ICD-10-CM

## 2023-08-01 DIAGNOSIS — F41.9 ANXIETY DISORDER, UNSPECIFIED: ICD-10-CM

## 2023-08-01 RX ORDER — VENLAFAXINE HYDROCHLORIDE 150 MG/1
CAPSULE, EXTENDED RELEASE ORAL
Qty: 90 CAPSULE | Refills: 1 | Status: SHIPPED | OUTPATIENT
Start: 2023-08-01 | End: 2024-01-15 | Stop reason: SDUPTHER

## 2023-09-11 DIAGNOSIS — F41.9 ANXIETY DISORDER, UNSPECIFIED: ICD-10-CM

## 2023-09-11 DIAGNOSIS — G43.909 MIGRAINE, UNSPECIFIED, NOT INTRACTABLE, WITHOUT STATUS MIGRAINOSUS: ICD-10-CM

## 2023-09-11 DIAGNOSIS — F32.A DEPRESSION, UNSPECIFIED: ICD-10-CM

## 2023-09-12 RX ORDER — ARIPIPRAZOLE 5 MG/1
5 TABLET ORAL DAILY
Qty: 90 TABLET | Refills: 3 | Status: SHIPPED | OUTPATIENT
Start: 2023-09-12 | End: 2023-09-28 | Stop reason: ALTCHOICE

## 2023-09-12 RX ORDER — SUMATRIPTAN 50 MG/1
TABLET, FILM COATED ORAL
Qty: 9 TABLET | Refills: 1 | Status: SHIPPED | OUTPATIENT
Start: 2023-09-12 | End: 2023-09-28 | Stop reason: SDUPTHER

## 2023-09-13 DIAGNOSIS — Z87.898 PERSONAL HISTORY OF OTHER SPECIFIED CONDITIONS: ICD-10-CM

## 2023-09-13 DIAGNOSIS — K59.00 CONSTIPATION, UNSPECIFIED CONSTIPATION TYPE: ICD-10-CM

## 2023-09-13 PROBLEM — L20.81 ATOPIC NEURODERMATITIS: Status: ACTIVE | Noted: 2020-03-23

## 2023-09-13 RX ORDER — DOCUSATE SODIUM 100 MG/1
100 CAPSULE, LIQUID FILLED ORAL 3 TIMES DAILY PRN
Qty: 90 CAPSULE | Refills: 3 | Status: SHIPPED | OUTPATIENT
Start: 2023-09-13 | End: 2024-01-15 | Stop reason: SDUPTHER

## 2023-09-13 RX ORDER — TIZANIDINE 4 MG/1
4 TABLET ORAL 3 TIMES DAILY
COMMUNITY
Start: 2023-08-19

## 2023-09-13 RX ORDER — MECLIZINE HYDROCHLORIDE 25 MG/1
TABLET ORAL
Qty: 90 TABLET | Refills: 1 | Status: SHIPPED | OUTPATIENT
Start: 2023-09-13 | End: 2023-11-14 | Stop reason: SDUPTHER

## 2023-09-27 PROBLEM — M89.9 BONE LESION: Status: ACTIVE | Noted: 2023-09-27

## 2023-09-27 PROBLEM — S93.401A RIGHT ANKLE SPRAIN: Status: ACTIVE | Noted: 2023-09-27

## 2023-09-28 ENCOUNTER — TELEPHONE (OUTPATIENT)
Dept: PRIMARY CARE | Facility: CLINIC | Age: 48
End: 2023-09-28

## 2023-09-28 ENCOUNTER — OFFICE VISIT (OUTPATIENT)
Dept: PRIMARY CARE | Facility: CLINIC | Age: 48
End: 2023-09-28
Payer: COMMERCIAL

## 2023-09-28 VITALS
DIASTOLIC BLOOD PRESSURE: 77 MMHG | HEIGHT: 66 IN | WEIGHT: 176 LBS | HEART RATE: 87 BPM | SYSTOLIC BLOOD PRESSURE: 111 MMHG | OXYGEN SATURATION: 96 % | BODY MASS INDEX: 28.28 KG/M2

## 2023-09-28 DIAGNOSIS — G43.909 MIGRAINE, UNSPECIFIED, NOT INTRACTABLE, WITHOUT STATUS MIGRAINOSUS: ICD-10-CM

## 2023-09-28 DIAGNOSIS — N32.81 OVERACTIVE BLADDER: ICD-10-CM

## 2023-09-28 DIAGNOSIS — M25.571 ACUTE RIGHT ANKLE PAIN: Primary | ICD-10-CM

## 2023-09-28 PROCEDURE — 99214 OFFICE O/P EST MOD 30 MIN: CPT | Performed by: NURSE PRACTITIONER

## 2023-09-28 PROCEDURE — 4004F PT TOBACCO SCREEN RCVD TLK: CPT | Performed by: NURSE PRACTITIONER

## 2023-09-28 RX ORDER — MIRABEGRON 25 MG/1
25 TABLET, FILM COATED, EXTENDED RELEASE ORAL DAILY
Qty: 90 TABLET | Refills: 1 | Status: SHIPPED | OUTPATIENT
Start: 2023-09-28 | End: 2023-11-07 | Stop reason: SDUPTHER

## 2023-09-28 RX ORDER — SUMATRIPTAN 50 MG/1
TABLET, FILM COATED ORAL
Qty: 9 TABLET | Refills: 1 | Status: SHIPPED | OUTPATIENT
Start: 2023-09-28 | End: 2023-11-07 | Stop reason: SDUPTHER

## 2023-09-28 RX ORDER — PREDNISONE 10 MG/1
30 TABLET ORAL DAILY
Qty: 15 TABLET | Refills: 0 | Status: SHIPPED | OUTPATIENT
Start: 2023-09-28 | End: 2023-11-07 | Stop reason: ALTCHOICE

## 2023-09-28 RX ORDER — NAPROXEN 500 MG/1
500 TABLET ORAL
Qty: 60 TABLET | Refills: 0 | Status: SHIPPED | OUTPATIENT
Start: 2023-09-28 | End: 2023-12-27

## 2023-09-28 ASSESSMENT — ENCOUNTER SYMPTOMS
FLANK PAIN: 0
HEMATURIA: 0
DIFFICULTY URINATING: 0
TROUBLE SWALLOWING: 0
COUGH: 0
NERVOUS/ANXIOUS: 0
SHORTNESS OF BREATH: 0
CHOKING: 0
ABDOMINAL DISTENTION: 0
BLOOD IN STOOL: 0
ABDOMINAL PAIN: 0
WOUND: 0
PHOTOPHOBIA: 0
NUMBNESS: 0
PALPITATIONS: 0
FATIGUE: 0
APNEA: 0
DIARRHEA: 0
CONFUSION: 0
EYE REDNESS: 0
WEAKNESS: 0
FEVER: 0
ARTHRALGIAS: 1
JOINT SWELLING: 0
WHEEZING: 0
FACIAL ASYMMETRY: 0
MYALGIAS: 0
SPEECH DIFFICULTY: 0
EYE PAIN: 0
UNEXPECTED WEIGHT CHANGE: 0
BACK PAIN: 0
SEIZURES: 0
SLEEP DISTURBANCE: 0
DIZZINESS: 0
HEADACHES: 0
FREQUENCY: 0
CHILLS: 0
DYSURIA: 0
SORE THROAT: 0
NAUSEA: 0
VOMITING: 0
NECK PAIN: 0
CHEST TIGHTNESS: 0
CONSTIPATION: 0

## 2023-09-28 ASSESSMENT — PATIENT HEALTH QUESTIONNAIRE - PHQ9
SUM OF ALL RESPONSES TO PHQ9 QUESTIONS 1 AND 2: 0
1. LITTLE INTEREST OR PLEASURE IN DOING THINGS: NOT AT ALL
2. FEELING DOWN, DEPRESSED OR HOPELESS: NOT AT ALL

## 2023-09-28 NOTE — PROGRESS NOTES
"Subjective   Patient ID: Africa Gastelum is a 47 y.o. female who presents for Ankle Injury (Emergency room follow up).      HPI:  Presents today for C/O RIGHT ANKLE PAIN X 1 WEEK  modifying factors consists of SHE WAS WALKING AND HER ANKLE \"ROLLED\"  associated symptoms consist of PAIN WORSE WHEN WALKING. PAIN COMES AND GOES BUT CONSTANT WHEN WALKING.  prior treatment consists of medication BOOT FROM THE ER       OVERACTIVE BLADDER- OXYBUTYNIN NOT HELPING. WILL TRY MYRBETRIQ. REFUSING REFERRAL AT THIS TIME       Visit Vitals  /77 (BP Location: Left arm, Patient Position: Sitting)   Pulse 87   Ht 1.664 m (5' 5.5\")   Wt 79.8 kg (176 lb)   LMP  (LMP Unknown)   SpO2 96%   BMI 28.84 kg/m²   OB Status Having periods   Smoking Status Every Day   BSA 1.92 m²        Review of Systems   Constitutional:  Negative for chills, fatigue, fever and unexpected weight change.   HENT:  Negative for congestion, ear pain, sore throat and trouble swallowing.    Eyes:  Negative for photophobia, pain, redness and visual disturbance.   Respiratory:  Negative for apnea, cough, choking, chest tightness, shortness of breath and wheezing.    Cardiovascular:  Negative for chest pain, palpitations and leg swelling.   Gastrointestinal:  Negative for abdominal distention, abdominal pain, blood in stool, constipation, diarrhea, nausea and vomiting.   Genitourinary:  Negative for difficulty urinating, dysuria, flank pain, frequency, hematuria and urgency.   Musculoskeletal:  Positive for arthralgias. Negative for back pain, gait problem, joint swelling, myalgias and neck pain.   Skin:  Negative for rash and wound.   Neurological:  Negative for dizziness, seizures, syncope, facial asymmetry, speech difficulty, weakness, numbness and headaches.   Psychiatric/Behavioral:  Negative for confusion, sleep disturbance and suicidal ideas. The patient is not nervous/anxious.        Objective   Study Result    Narrative & Impression   Interpreted By:  " RHINA RUSSELL MD  MRN: [id number]  Patient Name: MELY KEARNEY     STUDY:  ANKLE, COMPLETE, MIN 3 VIEWS; FOOT; COMPLETE, MIN 3 VIEWS;  9/22/2023  6:57 am     INDICATION:  fall with pain .     COMPARISON:  Right ankle series of 12/01/2014.     ACCESSION NUMBER(S):  [id number]; [id number]     ORDERING CLINICIAN:  DENISE ALMENDAREZ     TECHNIQUE:  Right ankle series performed with 3 images. Right foot series  performed with 3 images.     FINDINGS:  There is soft tissue swelling greatest at the lateral and anterior  aspect of the ankle. At the distal aspect of the distal fibular  metaphysis there is a new round approximate 1.2 cm lucent lesion with  sclerotic margin. Adjacent to the medial malleolar tip there is a  small ovoid nonacute appearing ossicle which could be related to an  old avulsion injury. Ankle alignment including the mortise appears  intact. No acute fracture or subluxation is seen.     Right foot alignment appears intact. There is a small ovoid sclerotic  lesion of the 3rd metatarsal distal head most likely a small bone  island. No acute fracture or subluxation is seen.     IMPRESSION:  Lateral soft tissue swelling of the ankle.     New indeterminate round lucent lesion with sclerotic margin of the  distal fibular metaphysis. MRI may be considered for further  characterization.     Small nonacute appearing ossicle near the medial malleolar tip may be  related to old trauma/avulsion injury.     No acute fracture or subluxation.     Physical Exam  Constitutional:       Appearance: Normal appearance. She is normal weight.   HENT:      Head: Normocephalic.   Eyes:      Extraocular Movements: Extraocular movements intact.      Conjunctiva/sclera: Conjunctivae normal.      Pupils: Pupils are equal, round, and reactive to light.   Cardiovascular:      Rate and Rhythm: Normal rate and regular rhythm.      Pulses: Normal pulses.      Heart sounds: Normal heart sounds.   Pulmonary:      Effort: Pulmonary effort  is normal.      Breath sounds: Normal breath sounds.   Musculoskeletal:         General: Normal range of motion.      Cervical back: Normal range of motion.      Comments: RIGHT ANKLE PAIN TO BOTH MEDIAL AND LATERAL ASPECT. LARGE AMOUNT OF BRUISING NOTED    Skin:     General: Skin is warm and dry.   Neurological:      General: No focal deficit present.      Mental Status: She is alert and oriented to person, place, and time.   Psychiatric:         Mood and Affect: Mood normal.         Behavior: Behavior normal.         Thought Content: Thought content normal.         Judgment: Judgment normal.            Assessment/Plan   Problem List Items Addressed This Visit       Right ankle pain - Primary    Relevant Orders    CT ankle right wo IV contrast     Other Visit Diagnoses       Migraine, unspecified, not intractable, without status migrainosus        Relevant Medications    SUMAtriptan (Imitrex) 50 mg tablet            SHE DOES HAVE WHAT SHE BELIEVES TO BE TITANIUM LATOSHA AND SCREWS IN HER LOW BACK BUT % SURE. I WILL ORDER CT VS MRI AND SHE IS GOING TO [facility] THROUGH HER PAST MEDICAL HISTORY TO VERIFY IF MRI IS NEEDED       USE COMPRESSION, ELEVATION, AND ICE TO AFFECTED AREA 20 MINUTES ON/20 MINUTES OFF    WE DISCUSSED MOST COMMON SIDE EFFECTS OF PRESCRIBED MEDICATIONS. INDICATIONS, RISK, COMPLICATIONS, AND ALTERNATIVES OF MEDICATION/THERAPEUTICS WERE EXPLAINED AND DISCUSSED. PLEASE MONITOR CLOSELY FOR ANY UNTOWARD SIDE EFFECTS OR COMPLICATIONS OF MEDICATIONS. PATIENT IS STRONGLY ADVISED TO BE COMPLIANT WITH RECOMMENDATIONS. QUESTIONS AND CONCERNS WERE ADDRESSED. INSTRUCTED TO CALL, RETURN SOONER, OR GO TO THE ER,  IF SYMPTOMS PERSIST OR WORSEN. THEY VOICED UNDERSTANDING AND  DENIES FURTHER QUESTIONS AT THIS TIME.    TIME CODE  1. PREPARATION FOR PATIENT'S VISIT (REVIEWING CHART, CURRENT MEDICAL RECORDS, OUTSIDE HEALTH PROVIDER RECORDS, PREVIOUS HISTORY, EXAM, TEST, PROCEDURE, AND MEDICATIONS)  2. FACE TO FACE  ENCOUNTER OBTAINING HISTORY FROM THE PATIENT/FAMILY/CAREGIVERS; PERFORMING EVALUATION AND EXAMINATION; ORDERING TESTS OR PROCEDURES; REFERRING AND COMMUNICATING WITH OTHER HEALTHCARE PROVIDERS; COUNSELING AND EDUCATION OF THE PATIENT/FAMILY/CAREGIVERS; INDEPENDENTLY INTERPRETING RESULTS (TESTS, LABS, PROCEDURES, IMAGING) AND COMMUNICATING AND EXPLAINING RESULTS TO THE PATIENT/FAMILY/CAREGIVERS  3. COORDINATION OF CARE; PREPARING AND PRINTING DISCHARGE INSTRUCTIONS AND ANY EDUCATIONAL MATERIAL FOR THE PATIENT/FAMILY/CAREGIVERS. DOCUMENTING CLINICAL INFORMATION IN THE ELECTRONIC MEDICAL RECORD   4. REVIEWING OARRS AS NEEDED    MDM  1) COMPLEXITY: MORE THAN 1 STABLE CHRONIC CONDITION ADDRESSED OR 1 ACUTE ILLNESS ADDRESSED   2)DATA: TESTS INTERPRETED AND OR ORDERED, TOOK INDEPENDENT HISTORY OR RECORDS REVIEWED  3)RISK: MODERATE RISK DUE TO NATURE OF MEDICAL CONDITIONS/COMORBIDITY OR MEDICATIONS ORDERED OR SURGICAL OR PROCEDURE REFERRAL    FU AFTER TESTING

## 2023-09-29 VITALS — WEIGHT: 177.91 LBS | BODY MASS INDEX: 29.64 KG/M2 | HEIGHT: 65 IN

## 2023-10-04 ENCOUNTER — APPOINTMENT (OUTPATIENT)
Dept: RADIOLOGY | Facility: HOSPITAL | Age: 48
End: 2023-10-04
Payer: COMMERCIAL

## 2023-10-10 ENCOUNTER — HOSPITAL ENCOUNTER (OUTPATIENT)
Dept: RADIOLOGY | Facility: HOSPITAL | Age: 48
Discharge: HOME | End: 2023-10-10
Payer: COMMERCIAL

## 2023-10-10 DIAGNOSIS — M25.571 ACUTE RIGHT ANKLE PAIN: ICD-10-CM

## 2023-10-10 PROCEDURE — 73700 CT LOWER EXTREMITY W/O DYE: CPT | Mod: RT

## 2023-10-10 PROCEDURE — 73700 CT LOWER EXTREMITY W/O DYE: CPT | Mod: RIGHT SIDE | Performed by: STUDENT IN AN ORGANIZED HEALTH CARE EDUCATION/TRAINING PROGRAM

## 2023-10-11 ENCOUNTER — TELEPHONE (OUTPATIENT)
Dept: PRIMARY CARE | Facility: CLINIC | Age: 48
End: 2023-10-11
Payer: COMMERCIAL

## 2023-10-11 DIAGNOSIS — S92.211A CLOSED DISPLACED FRACTURE OF CUBOID OF RIGHT FOOT, INITIAL ENCOUNTER: Primary | ICD-10-CM

## 2023-10-11 DIAGNOSIS — F32.1 MAJOR DEPRESSIVE DISORDER, SINGLE EPISODE, MODERATE (MULTI): ICD-10-CM

## 2023-10-11 RX ORDER — ARIPIPRAZOLE 10 MG/1
TABLET ORAL
Qty: 90 TABLET | Refills: 1 | Status: SHIPPED | OUTPATIENT
Start: 2023-10-11 | End: 2024-04-05

## 2023-10-11 NOTE — TELEPHONE ENCOUNTER
I have called patient to verify where she would like the referral sent. She is agreeable to have it sent to [facility] in [place], patient aware the foot and ankle office will call her to schedule.

## 2023-10-12 ENCOUNTER — TELEPHONE (OUTPATIENT)
Dept: PRIMARY CARE | Facility: CLINIC | Age: 48
End: 2023-10-12
Payer: COMMERCIAL

## 2023-10-18 RX ORDER — OXYBUTYNIN CHLORIDE 10 MG/1
10 TABLET, EXTENDED RELEASE ORAL
COMMUNITY
Start: 2023-07-17 | End: 2024-05-28 | Stop reason: SDUPTHER

## 2023-10-19 ENCOUNTER — TELEPHONE (OUTPATIENT)
Dept: PRIMARY CARE | Facility: CLINIC | Age: 48
End: 2023-10-19

## 2023-10-19 ENCOUNTER — OFFICE VISIT (OUTPATIENT)
Dept: PRIMARY CARE | Facility: CLINIC | Age: 48
End: 2023-10-19
Payer: COMMERCIAL

## 2023-10-19 VITALS
BODY MASS INDEX: 28.28 KG/M2 | HEART RATE: 82 BPM | HEIGHT: 66 IN | DIASTOLIC BLOOD PRESSURE: 69 MMHG | WEIGHT: 176 LBS | SYSTOLIC BLOOD PRESSURE: 107 MMHG | OXYGEN SATURATION: 95 %

## 2023-10-19 DIAGNOSIS — R93.89 ABNORMAL CT SCAN: ICD-10-CM

## 2023-10-19 DIAGNOSIS — S92.211A CLOSED DISPLACED FRACTURE OF CUBOID OF RIGHT FOOT, INITIAL ENCOUNTER: Primary | ICD-10-CM

## 2023-10-19 DIAGNOSIS — F60.3 BORDERLINE PERSONALITY DISORDER (MULTI): ICD-10-CM

## 2023-10-19 PROCEDURE — 99214 OFFICE O/P EST MOD 30 MIN: CPT | Performed by: NURSE PRACTITIONER

## 2023-10-19 PROCEDURE — 4004F PT TOBACCO SCREEN RCVD TLK: CPT | Performed by: NURSE PRACTITIONER

## 2023-10-19 ASSESSMENT — ENCOUNTER SYMPTOMS
FLANK PAIN: 0
WEAKNESS: 0
SPEECH DIFFICULTY: 0
FATIGUE: 0
CHEST TIGHTNESS: 0
BLOOD IN STOOL: 0
CONSTIPATION: 0
HEADACHES: 0
JOINT SWELLING: 0
DIFFICULTY URINATING: 0
CHILLS: 0
FREQUENCY: 0
CONFUSION: 0
MYALGIAS: 0
ABDOMINAL PAIN: 0
UNEXPECTED WEIGHT CHANGE: 0
HEMATURIA: 0
APNEA: 0
DYSURIA: 0
VOMITING: 0
FEVER: 0
ARTHRALGIAS: 1
NECK PAIN: 0
FACIAL ASYMMETRY: 0
SORE THROAT: 0
TROUBLE SWALLOWING: 0
NUMBNESS: 0
WOUND: 0
EYE REDNESS: 0
DIARRHEA: 0
PHOTOPHOBIA: 0
SEIZURES: 0
COUGH: 0
EYE PAIN: 0
WHEEZING: 0
SHORTNESS OF BREATH: 0
ABDOMINAL DISTENTION: 0
BACK PAIN: 0
NAUSEA: 0
SLEEP DISTURBANCE: 0
NERVOUS/ANXIOUS: 0
CHOKING: 0
PALPITATIONS: 0
DIZZINESS: 0

## 2023-10-19 ASSESSMENT — PATIENT HEALTH QUESTIONNAIRE - PHQ9
SUM OF ALL RESPONSES TO PHQ9 QUESTIONS 1 AND 2: 0
2. FEELING DOWN, DEPRESSED OR HOPELESS: NOT AT ALL
1. LITTLE INTEREST OR PLEASURE IN DOING THINGS: NOT AT ALL

## 2023-10-19 NOTE — PROGRESS NOTES
"Subjective   Patient ID: Africa Gastelum is a 47 y.o. female who presents for Follow-up (After CT).      HPI:  Presents today for FU CT RIGHT FOOT. CT SHOWED FRACTURE AND INCIDENTAL CYSTIC LESION IN FOOT. RECOMMENDS MRI FOR FURTHER EVAL OF FINDINGS. SHE DID SEE PODIATRY THIS WEEK.  SHE STARTS PT ON 10/30/23 AND 11/15/23 FU APPT WITH P[facility].  FOOT PAIN REMAINS. NO NEW COMPLAINTS       Visit Vitals  /69 (BP Location: Right arm, Patient Position: Sitting)   Pulse 82   Ht 1.664 m (5' 5.5\")   Wt 79.8 kg (176 lb)   LMP  (LMP Unknown)   SpO2 95%   BMI 28.84 kg/m²   OB Status Having periods   Smoking Status Every Day   BSA 1.92 m²        Review of Systems   Constitutional:  Negative for chills, fatigue, fever and unexpected weight change.   HENT:  Negative for congestion, ear pain, sore throat and trouble swallowing.    Eyes:  Negative for photophobia, pain, redness and visual disturbance.   Respiratory:  Negative for apnea, cough, choking, chest tightness, shortness of breath and wheezing.    Cardiovascular:  Negative for chest pain, palpitations and leg swelling.   Gastrointestinal:  Negative for abdominal distention, abdominal pain, blood in stool, constipation, diarrhea, nausea and vomiting.   Genitourinary:  Negative for difficulty urinating, dysuria, flank pain, frequency, hematuria and urgency.   Musculoskeletal:  Positive for arthralgias. Negative for back pain, gait problem, joint swelling, myalgias and neck pain.   Skin:  Negative for rash and wound.   Neurological:  Negative for dizziness, seizures, syncope, facial asymmetry, speech difficulty, weakness, numbness and headaches.   Psychiatric/Behavioral:  Negative for confusion, sleep disturbance and suicidal ideas. The patient is not nervous/anxious.        Objective   Study Result    Narrative & Impression   Interpreted By:  Keke Stratton,   STUDY:  CT ANKLE RIGHT WO IV CONTRAST;  10/10/2023 3:23 pm    "   INDICATION:  Signs/Symptoms:ANKLE PAIN S/P INJURY.      COMPARISON:  Right foot radiographs dated 09/22/2022      ACCESSION NUMBER(S):  [id number]      ORDERING CLINICIAN:  RADHA CLARK      TECHNIQUE:  CT imaging of the  right was obtained  without administration of  intravenous contrast medium. Coronal and sagittal reformatted images  were performed. 3D reformatted images were created and reviewed.      FINDINGS:  OSSEOUS STRUCTURES:  There is a minimally displaced linear fracture fragment along the  peripheral aspect of the lateral process of talus (best seen on axial  image 150/292 and coronal image 33/62) concerning for  age-indeterminate osseous avulsion of anterior talofibular ligament.  There is also thin linear minimally displaced fracture fragment  through the peripheral aspect of the distal cuboid bone (as seen on  axial image 255/293). This is also concerning for osseous avulsion.  There is also additional fracture through the peripheral medial  aspect of distal cuboid bone at the level of tarsometatarsal joint  articulation (best seen on axial image 264/293). Otherwise rest of  the osseous structures appear grossly unremarkable. Tibiotalar and  subtalar joint articulations are maintained. Midfoot joint  articulations are maintained. Note made of os peroneus.      There is a well-circumscribed round lucent lesion in the posterior  aspect of the distal fibular styloid process measuring approximately  9 x 8 x 8 mm. This demonstrates sclerotic rim with internal  septations. There is focal cortical defect along the lateral aspect  of this lesion (best seen on axial image 154/293).      SOFT TISSUES:  There is diffuse soft tissue swelling about the lateral aspect of the  ankle. Evaluation of tendons and ligaments is limited due to CT  technique. Within this limitation, tendons appear grossly  unremarkable. Otherwise rest of the soft tissues appear grossly  unremarkable.      IMPRESSION:  1. Findings  concerning for age-indeterminate (likely acute) osseous  avulsion along the lateral aspect of the talus at the expected  attachment of anterior talofibular ligament. An MRI can be performed  for further evaluation.  2. Peripheral minimally displaced subtle acute fractures through  lateral and medial aspect of the distal cuboid as described above.  These are also located at the expected ligamentous attachments and is  concerning for ligamentous avulsion.  3. Incidental note of a 9 x 8 x 8 mm well-circumscribed lytic lesion  in the distal fibular styloid process demonstrating sclerotic rim and  internal septations. This is new compared to prior radiograph dating  back to 2014. There is focal cortical defect along the lateral aspect  of the lesion as described above. This is most likely favored to be  benign with differential considerations include but not limited to an  intraosseous ganglion versus a low-grade chondromatosis lesion like  enchondroma versus cystic changes sequela of prior trauma. However a  six-month radiographic follow-up can be obtained to demonstrate  stability.      MACRO:  Critical Finding:  See findings. Notification was initiated on  10/11/2023 at 10:17 am by  Keke Stratton.  (**-YCF-**) Instructions:      Signed by: Keke Stratton 10/11/2023 10:18 AM  Dictation workstation:   [id number]     Physical Exam  Constitutional:       Appearance: Normal appearance. She is normal weight.   HENT:      Head: Normocephalic.   Eyes:      Extraocular Movements: Extraocular movements intact.      Conjunctiva/sclera: Conjunctivae normal.      Pupils: Pupils are equal, round, and reactive to light.   Cardiovascular:      Rate and Rhythm: Normal rate and regular rhythm.      Pulses: Normal pulses.      Heart sounds: Normal heart sounds.   Pulmonary:      Effort: Pulmonary effort is normal.      Breath sounds: Normal breath sounds.   Musculoskeletal:         General: Normal range of motion.      Cervical  back: Normal range of motion.      Comments: RIGHT FOOT PAIN   Skin:     General: Skin is warm and dry.   Neurological:      General: No focal deficit present.      Mental Status: She is alert and oriented to person, place, and time.   Psychiatric:         Mood and Affect: Mood normal.         Behavior: Behavior normal.         Thought Content: Thought content normal.         Judgment: Judgment normal.            Assessment/Plan   Problem List Items Addressed This Visit       Borderline personality disorder (CMS/HCC)    Closed displaced fracture of cuboid bone of right foot - Primary    Relevant Orders    MR foot right wo IV contrast     Other Visit Diagnoses       Abnormal CT scan        Relevant Orders    MR foot right wo IV contrast            WE DISCUSSED MOST COMMON SIDE EFFECTS OF PRESCRIBED MEDICATIONS. INDICATIONS, RISK, COMPLICATIONS, AND ALTERNATIVES OF MEDICATION/THERAPEUTICS WERE EXPLAINED AND DISCUSSED. PLEASE MONITOR CLOSELY FOR ANY UNTOWARD SIDE EFFECTS OR COMPLICATIONS OF MEDICATIONS. PATIENT IS STRONGLY ADVISED TO BE COMPLIANT WITH RECOMMENDATIONS. QUESTIONS AND CONCERNS WERE ADDRESSED. INSTRUCTED TO CALL, RETURN SOONER, OR GO TO THE ER,  IF SYMPTOMS PERSIST OR WORSEN. THEY VOICED UNDERSTANDING AND  DENIES FURTHER QUESTIONS AT THIS TIME.    TIME CODE  1. PREPARATION FOR PATIENT'S VISIT (REVIEWING CHART, CURRENT MEDICAL RECORDS, OUTSIDE HEALTH PROVIDER RECORDS, PREVIOUS HISTORY, EXAM, TEST, PROCEDURE, AND MEDICATIONS)  2. FACE TO FACE ENCOUNTER OBTAINING HISTORY FROM THE PATIENT/FAMILY/CAREGIVERS; PERFORMING EVALUATION AND EXAMINATION; ORDERING TESTS OR PROCEDURES; REFERRING AND COMMUNICATING WITH OTHER HEALTHCARE PROVIDERS; COUNSELING AND EDUCATION OF THE PATIENT/FAMILY/CAREGIVERS; INDEPENDENTLY INTERPRETING RESULTS (TESTS, LABS, PROCEDURES, IMAGING) AND COMMUNICATING AND EXPLAINING RESULTS TO THE PATIENT/FAMILY/CAREGIVERS  3. COORDINATION OF CARE; PREPARING AND PRINTING DISCHARGE INSTRUCTIONS AND  ANY EDUCATIONAL MATERIAL FOR THE PATIENT/FAMILY/CAREGIVERS. DOCUMENTING CLINICAL INFORMATION IN THE ELECTRONIC MEDICAL RECORD   4. REVIEWING OARRS AS NEEDED    MDM  1) COMPLEXITY: MORE THAN 1 STABLE CHRONIC CONDITION ADDRESSED OR 1 ACUTE ILLNESS ADDRESSED   2)DATA: TESTS INTERPRETED AND OR ORDERED, TOOK INDEPENDENT HISTORY OR RECORDS REVIEWED  3)RISK: MODERATE RISK DUE TO NATURE OF MEDICAL CONDITIONS/COMORBIDITY OR MEDICATIONS ORDERED OR SURGICAL OR PROCEDURE REFERRAL      Follow up as before

## 2023-10-30 ENCOUNTER — DOCUMENTATION (OUTPATIENT)
Dept: PHYSICAL THERAPY | Facility: CLINIC | Age: 48
End: 2023-10-30
Payer: COMMERCIAL

## 2023-10-30 NOTE — PROGRESS NOTES
Physical Therapy                 Therapy Communication Note    Patient Name: Africa Gastelum  MRN: [id number]  Today's Date: 10/30/2023     Discipline: Physical Therapy    Missed Time: No Show    Comment:

## 2023-11-02 DIAGNOSIS — K21.9 GASTRO-ESOPHAGEAL REFLUX DISEASE WITHOUT ESOPHAGITIS: ICD-10-CM

## 2023-11-03 ENCOUNTER — HOSPITAL ENCOUNTER (OUTPATIENT)
Dept: RADIOLOGY | Facility: HOSPITAL | Age: 48
Discharge: HOME | End: 2023-11-03
Payer: COMMERCIAL

## 2023-11-03 DIAGNOSIS — R93.89 ABNORMAL CT SCAN: ICD-10-CM

## 2023-11-03 DIAGNOSIS — S92.211A CLOSED DISPLACED FRACTURE OF CUBOID OF RIGHT FOOT, INITIAL ENCOUNTER: ICD-10-CM

## 2023-11-03 PROCEDURE — 73718 MRI LOWER EXTREMITY W/O DYE: CPT | Mod: RT

## 2023-11-03 PROCEDURE — 73721 MRI JNT OF LWR EXTRE W/O DYE: CPT | Mod: RIGHT SIDE | Performed by: RADIOLOGY

## 2023-11-03 RX ORDER — OMEPRAZOLE 40 MG/1
40 CAPSULE, DELAYED RELEASE ORAL DAILY
Qty: 90 CAPSULE | Refills: 3 | Status: SHIPPED | OUTPATIENT
Start: 2023-11-03

## 2023-11-04 ENCOUNTER — LAB (OUTPATIENT)
Dept: LAB | Facility: LAB | Age: 48
End: 2023-11-04
Payer: COMMERCIAL

## 2023-11-04 DIAGNOSIS — E53.8 VITAMIN B12 DEFICIENCY: ICD-10-CM

## 2023-11-04 DIAGNOSIS — E61.1 LOW IRON: ICD-10-CM

## 2023-11-04 DIAGNOSIS — E78.00 HYPERCHOLESTEROLEMIA: ICD-10-CM

## 2023-11-04 DIAGNOSIS — E53.8 FOLATE DEFICIENCY: ICD-10-CM

## 2023-11-04 DIAGNOSIS — E55.9 VITAMIN D DEFICIENCY: ICD-10-CM

## 2023-11-04 DIAGNOSIS — N18.2 CKD (CHRONIC KIDNEY DISEASE) STAGE 2, GFR 60-89 ML/MIN: ICD-10-CM

## 2023-11-04 LAB
25(OH)D3 SERPL-MCNC: 52 NG/ML (ref 30–100)
ALBUMIN SERPL BCP-MCNC: 3.9 G/DL (ref 3.4–5)
ALP SERPL-CCNC: 95 U/L (ref 33–110)
ALT SERPL W P-5'-P-CCNC: 11 U/L (ref 7–45)
ANION GAP SERPL CALC-SCNC: 8 MMOL/L (ref 10–20)
AST SERPL W P-5'-P-CCNC: 12 U/L (ref 9–39)
BASOPHILS # BLD AUTO: 0.05 X10*3/UL (ref 0–0.1)
BASOPHILS NFR BLD AUTO: 0.7 %
BILIRUB SERPL-MCNC: 0.4 MG/DL (ref 0–1.2)
BUN SERPL-MCNC: 8 MG/DL (ref 6–23)
CALCIUM SERPL-MCNC: 8.9 MG/DL (ref 8.6–10.3)
CHLORIDE SERPL-SCNC: 106 MMOL/L (ref 98–107)
CO2 SERPL-SCNC: 29 MMOL/L (ref 21–32)
CREAT SERPL-MCNC: 0.87 MG/DL (ref 0.5–1.05)
EOSINOPHIL # BLD AUTO: 0.07 X10*3/UL (ref 0–0.7)
EOSINOPHIL NFR BLD AUTO: 0.9 %
ERYTHROCYTE [DISTWIDTH] IN BLOOD BY AUTOMATED COUNT: 15.3 % (ref 11.5–14.5)
EST. AVERAGE GLUCOSE BLD GHB EST-MCNC: 103 MG/DL
FERRITIN SERPL-MCNC: 151 NG/ML (ref 8–150)
FOLATE SERPL-MCNC: 19.3 NG/ML
GFR SERPL CREATININE-BSD FRML MDRD: 83 ML/MIN/1.73M*2
GLUCOSE SERPL-MCNC: 87 MG/DL (ref 74–99)
HBA1C MFR BLD: 5.2 %
HCT VFR BLD AUTO: 45.1 % (ref 36–46)
HGB BLD-MCNC: 14.9 G/DL (ref 12–16)
IMM GRANULOCYTES # BLD AUTO: 0.02 X10*3/UL (ref 0–0.7)
IMM GRANULOCYTES NFR BLD AUTO: 0.3 % (ref 0–0.9)
IRON SATN MFR SERPL: 33 % (ref 25–45)
IRON SERPL-MCNC: 77 UG/DL (ref 35–150)
LYMPHOCYTES # BLD AUTO: 1.83 X10*3/UL (ref 1.2–4.8)
LYMPHOCYTES NFR BLD AUTO: 24.5 %
MCH RBC QN AUTO: 30.8 PG (ref 26–34)
MCHC RBC AUTO-ENTMCNC: 33 G/DL (ref 32–36)
MCV RBC AUTO: 93 FL (ref 80–100)
MONOCYTES # BLD AUTO: 0.34 X10*3/UL (ref 0.1–1)
MONOCYTES NFR BLD AUTO: 4.5 %
NEUTROPHILS # BLD AUTO: 5.17 X10*3/UL (ref 1.2–7.7)
NEUTROPHILS NFR BLD AUTO: 69.1 %
NRBC BLD-RTO: 0 /100 WBCS (ref 0–0)
PLATELET # BLD AUTO: 154 X10*3/UL (ref 150–450)
POTASSIUM SERPL-SCNC: 3.8 MMOL/L (ref 3.5–5.3)
PROT SERPL-MCNC: 6.3 G/DL (ref 6.4–8.2)
RBC # BLD AUTO: 4.83 X10*6/UL (ref 4–5.2)
SODIUM SERPL-SCNC: 139 MMOL/L (ref 136–145)
TIBC SERPL-MCNC: 235 UG/DL (ref 240–445)
TSH SERPL-ACNC: 0.84 MIU/L (ref 0.44–3.98)
UIBC SERPL-MCNC: 158 UG/DL (ref 110–370)
VIT B12 SERPL-MCNC: 592 PG/ML (ref 211–911)
WBC # BLD AUTO: 7.5 X10*3/UL (ref 4.4–11.3)

## 2023-11-04 PROCEDURE — 83550 IRON BINDING TEST: CPT

## 2023-11-04 PROCEDURE — 82746 ASSAY OF FOLIC ACID SERUM: CPT

## 2023-11-04 PROCEDURE — 36415 COLL VENOUS BLD VENIPUNCTURE: CPT

## 2023-11-04 PROCEDURE — 82728 ASSAY OF FERRITIN: CPT

## 2023-11-04 PROCEDURE — 82306 VITAMIN D 25 HYDROXY: CPT

## 2023-11-04 PROCEDURE — 83036 HEMOGLOBIN GLYCOSYLATED A1C: CPT

## 2023-11-04 PROCEDURE — 85025 COMPLETE CBC W/AUTO DIFF WBC: CPT

## 2023-11-04 PROCEDURE — 80053 COMPREHEN METABOLIC PANEL: CPT

## 2023-11-04 PROCEDURE — 82607 VITAMIN B-12: CPT

## 2023-11-04 PROCEDURE — 83540 ASSAY OF IRON: CPT

## 2023-11-04 PROCEDURE — 84443 ASSAY THYROID STIM HORMONE: CPT

## 2023-11-07 ENCOUNTER — OFFICE VISIT (OUTPATIENT)
Dept: PRIMARY CARE | Facility: CLINIC | Age: 48
End: 2023-11-07
Payer: COMMERCIAL

## 2023-11-07 VITALS
OXYGEN SATURATION: 96 % | DIASTOLIC BLOOD PRESSURE: 82 MMHG | HEIGHT: 66 IN | HEART RATE: 75 BPM | BODY MASS INDEX: 28.28 KG/M2 | SYSTOLIC BLOOD PRESSURE: 124 MMHG | WEIGHT: 176 LBS

## 2023-11-07 DIAGNOSIS — G43.909 MIGRAINE, UNSPECIFIED, NOT INTRACTABLE, WITHOUT STATUS MIGRAINOSUS: ICD-10-CM

## 2023-11-07 DIAGNOSIS — E53.8 VITAMIN B12 DEFICIENCY: ICD-10-CM

## 2023-11-07 DIAGNOSIS — G43.819 OTHER MIGRAINE WITHOUT STATUS MIGRAINOSUS, INTRACTABLE: ICD-10-CM

## 2023-11-07 DIAGNOSIS — K21.9 GASTROESOPHAGEAL REFLUX DISEASE, UNSPECIFIED WHETHER ESOPHAGITIS PRESENT: ICD-10-CM

## 2023-11-07 DIAGNOSIS — N32.81 OVERACTIVE BLADDER: ICD-10-CM

## 2023-11-07 DIAGNOSIS — E61.1 LOW IRON: ICD-10-CM

## 2023-11-07 DIAGNOSIS — E55.9 VITAMIN D DEFICIENCY: Primary | ICD-10-CM

## 2023-11-07 DIAGNOSIS — D16.30: ICD-10-CM

## 2023-11-07 DIAGNOSIS — E78.00 HYPERCHOLESTEROLEMIA: ICD-10-CM

## 2023-11-07 PROCEDURE — 99214 OFFICE O/P EST MOD 30 MIN: CPT | Performed by: NURSE PRACTITIONER

## 2023-11-07 PROCEDURE — 4004F PT TOBACCO SCREEN RCVD TLK: CPT | Performed by: NURSE PRACTITIONER

## 2023-11-07 RX ORDER — ATORVASTATIN CALCIUM 40 MG/1
40 TABLET, FILM COATED ORAL DAILY
Qty: 90 TABLET | Refills: 3 | Status: SHIPPED | OUTPATIENT
Start: 2023-11-07

## 2023-11-07 RX ORDER — SUMATRIPTAN 50 MG/1
TABLET, FILM COATED ORAL
Qty: 9 TABLET | Refills: 3 | Status: SHIPPED | OUTPATIENT
Start: 2023-11-07 | End: 2024-02-09 | Stop reason: SDUPTHER

## 2023-11-07 RX ORDER — MIRABEGRON 25 MG/1
25 TABLET, FILM COATED, EXTENDED RELEASE ORAL DAILY
Qty: 90 TABLET | Refills: 3 | Status: SHIPPED | OUTPATIENT
Start: 2023-11-07

## 2023-11-07 RX ORDER — AMITRIPTYLINE HYDROCHLORIDE 50 MG/1
50 TABLET, FILM COATED ORAL NIGHTLY
Qty: 90 TABLET | Refills: 3 | Status: SHIPPED | OUTPATIENT
Start: 2023-11-07

## 2023-11-07 RX ORDER — PANTOPRAZOLE SODIUM 40 MG/1
40 TABLET, DELAYED RELEASE ORAL
Qty: 90 TABLET | Refills: 3 | Status: SHIPPED | OUTPATIENT
Start: 2023-11-07 | End: 2023-11-07 | Stop reason: ALTCHOICE

## 2023-11-07 ASSESSMENT — ENCOUNTER SYMPTOMS
FATIGUE: 0
TROUBLE SWALLOWING: 0
PALPITATIONS: 0
FLANK PAIN: 0
DIZZINESS: 0
MYALGIAS: 0
HEADACHES: 0
DIFFICULTY URINATING: 0
ARTHRALGIAS: 1
FEVER: 0
SORE THROAT: 0
SPEECH DIFFICULTY: 0
NAUSEA: 0
UNEXPECTED WEIGHT CHANGE: 0
SHORTNESS OF BREATH: 0
NECK PAIN: 0
DIARRHEA: 0
CONFUSION: 0
CONSTIPATION: 0
WOUND: 0
VOMITING: 0
BLOOD IN STOOL: 0
WEAKNESS: 0
FACIAL ASYMMETRY: 0
NUMBNESS: 0
PHOTOPHOBIA: 0
HEMATURIA: 0
CHOKING: 0
JOINT SWELLING: 0
CHILLS: 0
APNEA: 0
SEIZURES: 0
ABDOMINAL PAIN: 0
SLEEP DISTURBANCE: 0
ABDOMINAL DISTENTION: 0
DYSURIA: 0
EYE REDNESS: 0
WHEEZING: 0
BACK PAIN: 0
EYE PAIN: 0
COUGH: 0
NERVOUS/ANXIOUS: 0
FREQUENCY: 0
CHEST TIGHTNESS: 0

## 2023-11-07 NOTE — PROGRESS NOTES
"Subjective   Patient ID: Daniel Gastelum is a 47 y.o. female who presents for Follow-up (6 month lab results, no concerns).      HPI:  Presents today for [facility] LABS AND MRI FOOT. NO NEW COMPLAINTS       VIT D- STABLE  IRON- STABLE  B12- STABLE  enchondroma noted on MRI foot- 11/15/23 FU APPT WITH P[facility]IATRY [facility].  FOOT PAIN REMAINS. DISCUSS MRI RESULTS. IMAGING NEEDS REPEATED IN 6 MONTHS. PODIATRY SHOULD TAKE OVER.  INSTRUCTED TO GET DISC TO TAKE TO APPT     Visit Vitals  /82 (BP Location: Right arm, Patient Position: Sitting)   Pulse 75   Ht 1.664 m (5' 5.5\")   Wt 79.8 kg (176 lb)   SpO2 96%   BMI 28.84 kg/m²   OB Status Having periods   Smoking Status Every Day   BSA 1.92 m²        Review of Systems   Constitutional:  Negative for chills, fatigue, fever and unexpected weight change.   HENT:  Negative for congestion, ear pain, sore throat and trouble swallowing.    Eyes:  Negative for photophobia, pain, redness and visual disturbance.   Respiratory:  Negative for apnea, cough, choking, chest tightness, shortness of breath and wheezing.    Cardiovascular:  Negative for chest pain, palpitations and leg swelling.   Gastrointestinal:  Negative for abdominal distention, abdominal pain, blood in stool, constipation, diarrhea, nausea and vomiting.   Genitourinary:  Negative for difficulty urinating, dysuria, flank pain, frequency, hematuria and urgency.   Musculoskeletal:  Positive for arthralgias. Negative for back pain, gait problem, joint swelling, myalgias and neck pain.   Skin:  Negative for rash and wound.   Neurological:  Negative for dizziness, seizures, syncope, facial asymmetry, speech difficulty, weakness, numbness and headaches.   Psychiatric/Behavioral:  Negative for confusion, sleep disturbance and suicidal ideas. The patient is not nervous/anxious.        Objective   Study Result    Narrative & Impression   Interpreted By:  Dario Briceno,   STUDY:  MRI of the right ankle without IV " contrast;  11/3/2023 5:52 pm      INDICATION:  Signs/Symptoms:FOOT FRACTURE NOTED ON CT. FURTHER IMAGING ON CYSTIC  LESION NOTED ON CT.      COMPARISON:  Right ankle CT 10/10/2023. Right ankle radiographs 09/22/2023.      ACCESSION NUMBER(S):  [id number]      ORDERING CLINICIAN:  RADHA CLARK      TECHNIQUE:  Multiplanar multisequence MR imaging of the right ankle was obtained  without contrast      FINDINGS:  TENDONS:  The extensor tendons are intact and demonstrate a normal course. The  flexor tendons are intact and demonstrate a normal course. The  Achilles tendon is intact. The plantar aponeurosis is intact.      There is split tearing of the peroneus brevis tendon at the level of  the lateral malleolus.      LIGAMENTS:  There is avulsion of the anterior talofibular ligament from its talar  attachment consistent with the avulsion fracture as identified on  recent CT. The displaced ATFL fibers are intact. Low-grade sprain of  the calcaneofibular and deep deltoid ligaments with irregular fibers  and increased signal without lilliam fiber discontinuity. The anterior  and posterior tibiofibular ligaments are intact. The posterior  tibiofibular ligament and spring ligament are intact.      JOINTS:  There is no dislocation. There is no joint effusion. The articular  cartilage of the ankle joint is normal. There is no osteochondral  defect in the talar dome.      OSSEOUS STRUCTURES:  Avulsion fracture from the lateral talus at the talar attachment of  the ATFL was better seen on CT. There is focal marrow edema within  the medial and lateral aspects of the distal cuboid, correlating with  the nondisplaced fractures as identified on CT. There is also marrow  edema at the opposing surfaces of the medial talus and medial  malleolus, likely representing avulsive injury the deltoid ligament  attachments. There is edema within and neck of the talus likely  representing contusion.      There is a 0.8 x 0.8 x 0.9 cm  well-circumscribed fluid intensity  lesion at the distal most aspect of the fibula with thin internal  hypointense septations. No surrounding edema or soft tissue mass.      SOFT TISSUES:  There is no muscle atrophy or tear.  The tarsal tunnel is normal.  The sinus tarsi is normal with preservation of fat signal.      IMPRESSION:  1.  Avulsion fracture at the talar attachment of the anterior  talofibular ligament.  2. Focal edema at the medial and lateral aspects of the distal cuboid  corresponding to nondisplaced fractures better identified on CT.  3. Low-grade sprain of the calcaneofibular and deep deltoid ligaments.  4. Split tearing of the peroneus brevis tendon at the level of the  lateral malleolus.  5. Nonaggressive lesion in the lateral malleolus favored to represent  an enchondroma. Follow-up radiograph in 6 months is recommended.      I personally reviewed the images/study and I agree with the findings  as stated. This study was interpreted at [facility] [place], [place].       Component      Latest Ref Rn 11/4/2023   WBC      4.4 - 11.3 x10*3/uL 7.5    nRBC      0.0 - 0.0 /100 WBCs 0.0    RBC      4.00 - 5.20 x10*6/uL 4.83    HEMOGLOBIN      12.0 - 16.0 g/dL 14.9    HEMATOCRIT      36.0 - 46.0 % 45.1    MCV      80 - 100 fL 93    MCH      26.0 - 34.0 pg 30.8    MCHC      32.0 - 36.0 g/dL 33.0    RED CELL DISTRIBUTION WIDTH      11.5 - 14.5 % 15.3 (H)    Platelets      150 - 450 x10*3/uL 154    Neutrophils %      40.0 - 80.0 % 69.1    Immature Granulocytes %, Automated      0.0 - 0.9 % 0.3    Lymphocytes %      13.0 - 44.0 % 24.5    Monocytes %      2.0 - 10.0 % 4.5    Eosinophils %      0.0 - 6.0 % 0.9    Basophils %      0.0 - 2.0 % 0.7    Neutrophils Absolute      1.20 - 7.70 x10*3/uL 5.17    Immature Granulocytes Absolute, Automated      0.00 - 0.70 x10*3/uL 0.02    Lymphocytes Absolute      1.20 - 4.80 x10*3/uL 1.83    Monocytes Absolute      0.10 - 1.00 x10*3/uL  0.34    Eosinophils Absolute      0.00 - 0.70 x10*3/uL 0.07    Basophils Absolute      0.00 - 0.10 x10*3/uL 0.05    GLUCOSE      74 - 99 mg/dL 87    SODIUM      136 - 145 mmol/L 139    POTASSIUM      3.5 - 5.3 mmol/L 3.8    CHLORIDE      98 - 107 mmol/L 106    Bicarbonate      21 - 32 mmol/L 29    Anion Gap      10 - 20 mmol/L 8 (L)    Blood Urea Nitrogen      6 - 23 mg/dL 8    Creatinine      0.50 - 1.05 mg/dL 0.87    EGFR      >60 mL/min/1.73m*2 83    Calcium      8.6 - 10.3 mg/dL 8.9    Albumin      3.4 - 5.0 g/dL 3.9    Alkaline Phosphatase      33 - 110 U/L 95    Total Protein      6.4 - 8.2 g/dL 6.3 (L)    AST      9 - 39 U/L 12    Bilirubin Total      0.0 - 1.2 mg/dL 0.4    ALT      7 - 45 U/L 11    IRON      35 - 150 ug/dL 77    UIBC      110 - 370 ug/dL 158    TIBC      240 - 445 ug/dL 235 (L)    % Saturation      25 - 45 % 33    Hemoglobin A1C      see below % 5.2    Estimated Average Glucose      Not Established mg/dL 103    FERRITIN      8 - 150 ng/mL 151 (H)    Thyroid Stimulating Hormone      0.44 - 3.98 mIU/L 0.84    Vitamin D, 25-Hydroxy, Total      30 - 100 ng/mL 52    Vitamin B12      211 - 911 pg/mL 592    FOLATE      >5.0 ng/mL 19.3       Legend:  (H) High  (L) Low  Physical Exam  Constitutional:       Appearance: Normal appearance. She is normal weight.   HENT:      Head: Normocephalic.   Eyes:      Extraocular Movements: Extraocular movements intact.      Conjunctiva/sclera: Conjunctivae normal.      Pupils: Pupils are equal, round, and reactive to light.   Cardiovascular:      Rate and Rhythm: Normal rate and regular rhythm.      Pulses: Normal pulses.      Heart sounds: Normal heart sounds.   Pulmonary:      Effort: Pulmonary effort is normal.      Breath sounds: Normal breath sounds.   Musculoskeletal:         General: Normal range of motion.      Cervical back: Normal range of motion.   Skin:     General: Skin is warm and dry.   Neurological:      General: No focal deficit present.       Mental Status: She is alert and oriented to person, place, and time.   Psychiatric:         Mood and Affect: Mood normal.         Behavior: Behavior normal.         Thought Content: Thought content normal.         Judgment: Judgment normal.            Assessment/Plan   Problem List Items Addressed This Visit       GERD (gastroesophageal reflux disease)    Hypercholesterolemia    Relevant Medications    atorvastatin (Lipitor) 40 mg tablet    Migraine    Relevant Medications    amitriptyline (Elavil) 50 mg tablet    Overactive bladder    Relevant Medications    mirabegron (Myrbetriq) 25 mg tablet extended release 24 hr 24 hr tablet    Vitamin D deficiency - Primary    Migraine, unspecified, not intractable, without status migrainosus    Relevant Medications    SUMAtriptan (Imitrex) 50 mg tablet       WE DISCUSSED MOST COMMON SIDE EFFECTS OF PRESCRIBED MEDICATIONS. INDICATIONS, RISK, COMPLICATIONS, AND ALTERNATIVES OF MEDICATION/THERAPEUTICS WERE EXPLAINED AND DISCUSSED. PLEASE MONITOR CLOSELY FOR ANY UNTOWARD SIDE EFFECTS OR COMPLICATIONS OF MEDICATIONS. PATIENT IS STRONGLY ADVISED TO BE COMPLIANT WITH RECOMMENDATIONS. QUESTIONS AND CONCERNS WERE ADDRESSED. INSTRUCTED TO CALL, RETURN SOONER, OR GO TO THE ER,  IF SYMPTOMS PERSIST OR WORSEN. THEY VOICED UNDERSTANDING AND  DENIES FURTHER QUESTIONS AT THIS TIME.      TIME CODE  1. PREPARATION FOR PATIENT'S VISIT (REVIEWING CHART, CURRENT MEDICAL RECORDS, OUTSIDE HEALTH PROVIDER RECORDS, PREVIOUS HISTORY, EXAM, TEST, PROCEDURE, AND MEDICATIONS)  2. FACE TO FACE ENCOUNTER OBTAINING HISTORY FROM THE PATIENT/FAMILY/CAREGIVERS; PERFORMING EVALUATION AND EXAMINATION; ORDERING TESTS OR PROCEDURES; REFERRING AND COMMUNICATING WITH OTHER HEALTHCARE PROVIDERS; COUNSELING AND EDUCATION OF THE PATIENT/FAMILY/CAREGIVERS; INDEPENDENTLY INTERPRETING RESULTS (TESTS, LABS, PROCEDURES, IMAGING) AND COMMUNICATING AND EXPLAINING RESULTS TO THE PATIENT/FAMILY/CAREGIVERS  3. COORDINATION OF  CARE; PREPARING AND PRINTING DISCHARGE INSTRUCTIONS AND ANY EDUCATIONAL MATERIAL FOR THE PATIENT/FAMILY/CAREGIVERS. DOCUMENTING CLINICAL INFORMATION IN THE ELECTRONIC MEDICAL RECORD   4. REVIEWING OARRS AS NEEDED    MDM  1) COMPLEXITY: MORE THAN 1 STABLE CHRONIC CONDITION ADDRESSED OR 1 ACUTE ILLNESS ADDRESSED   2)DATA: TESTS INTERPRETED AND OR ORDERED, TOOK INDEPENDENT HISTORY OR RECORDS REVIEWED  3)RISK: MODERATE RISK DUE TO NATURE OF MEDICAL CONDITIONS/COMORBIDITY OR MEDICATIONS ORDERED OR SURGICAL OR PROCEDURE REFERRAL    6 MONTHS WITH LABS

## 2023-11-09 ENCOUNTER — TELEPHONE (OUTPATIENT)
Dept: PRIMARY CARE | Facility: CLINIC | Age: 48
End: 2023-11-09
Payer: COMMERCIAL

## 2023-11-09 DIAGNOSIS — G43.909 MIGRAINE, UNSPECIFIED, NOT INTRACTABLE, WITHOUT STATUS MIGRAINOSUS: ICD-10-CM

## 2023-11-11 DIAGNOSIS — Z87.898 PERSONAL HISTORY OF OTHER SPECIFIED CONDITIONS: ICD-10-CM

## 2023-11-14 RX ORDER — MECLIZINE HYDROCHLORIDE 25 MG/1
TABLET ORAL
Qty: 90 TABLET | Refills: 1 | Status: SHIPPED | OUTPATIENT
Start: 2023-11-14

## 2023-12-13 DIAGNOSIS — F17.200 NICOTINE DEPENDENCE, UNSPECIFIED, UNCOMPLICATED: ICD-10-CM

## 2023-12-14 RX ORDER — BUPROPION HYDROCHLORIDE 150 MG/1
TABLET, FILM COATED, EXTENDED RELEASE ORAL
Qty: 60 TABLET | Refills: 5 | Status: SHIPPED | OUTPATIENT
Start: 2023-12-14 | End: 2024-05-28 | Stop reason: SDUPTHER

## 2024-01-07 DIAGNOSIS — F32.A DEPRESSION, UNSPECIFIED: ICD-10-CM

## 2024-01-07 DIAGNOSIS — K59.00 CONSTIPATION, UNSPECIFIED CONSTIPATION TYPE: ICD-10-CM

## 2024-01-07 DIAGNOSIS — F41.9 ANXIETY DISORDER, UNSPECIFIED: ICD-10-CM

## 2024-01-15 RX ORDER — VENLAFAXINE HYDROCHLORIDE 150 MG/1
CAPSULE, EXTENDED RELEASE ORAL
Qty: 90 CAPSULE | Refills: 3 | Status: SHIPPED | OUTPATIENT
Start: 2024-01-15

## 2024-01-15 RX ORDER — DOCUSATE SODIUM 100 MG/1
100 CAPSULE, LIQUID FILLED ORAL 3 TIMES DAILY PRN
Qty: 90 CAPSULE | Refills: 0 | Status: SHIPPED | OUTPATIENT
Start: 2024-01-15

## 2024-02-09 RX ORDER — SUMATRIPTAN 50 MG/1
TABLET, FILM COATED ORAL
Qty: 9 TABLET | Refills: 11 | Status: SHIPPED | OUTPATIENT
Start: 2024-02-09

## 2024-04-03 DIAGNOSIS — F32.1 MAJOR DEPRESSIVE DISORDER, SINGLE EPISODE, MODERATE (MULTI): ICD-10-CM

## 2024-04-05 RX ORDER — ARIPIPRAZOLE 10 MG/1
TABLET ORAL
Qty: 90 TABLET | Refills: 1 | Status: SHIPPED | OUTPATIENT
Start: 2024-04-05

## 2024-04-29 ENCOUNTER — TELEPHONE (OUTPATIENT)
Dept: PRIMARY CARE | Facility: CLINIC | Age: 49
End: 2024-04-29
Payer: COMMERCIAL

## 2024-05-07 ENCOUNTER — APPOINTMENT (OUTPATIENT)
Dept: PRIMARY CARE | Facility: CLINIC | Age: 49
End: 2024-05-07
Payer: COMMERCIAL

## 2024-05-24 ENCOUNTER — LAB (OUTPATIENT)
Dept: LAB | Facility: LAB | Age: 49
End: 2024-05-24
Payer: COMMERCIAL

## 2024-05-24 DIAGNOSIS — G43.909 MIGRAINE, UNSPECIFIED, NOT INTRACTABLE, WITHOUT STATUS MIGRAINOSUS: ICD-10-CM

## 2024-05-24 DIAGNOSIS — E78.00 HYPERCHOLESTEROLEMIA: ICD-10-CM

## 2024-05-24 DIAGNOSIS — E61.1 LOW IRON: ICD-10-CM

## 2024-05-24 DIAGNOSIS — E53.8 VITAMIN B12 DEFICIENCY: ICD-10-CM

## 2024-05-24 DIAGNOSIS — E55.9 VITAMIN D DEFICIENCY: ICD-10-CM

## 2024-05-24 LAB
25(OH)D3 SERPL-MCNC: 34 NG/ML (ref 30–100)
ALBUMIN SERPL BCP-MCNC: 3.7 G/DL (ref 3.4–5)
ALP SERPL-CCNC: 90 U/L (ref 33–110)
ALT SERPL W P-5'-P-CCNC: 10 U/L (ref 7–45)
ANION GAP SERPL CALC-SCNC: 9 MMOL/L (ref 10–20)
AST SERPL W P-5'-P-CCNC: 13 U/L (ref 9–39)
BASOPHILS # BLD AUTO: 0.03 X10*3/UL (ref 0–0.1)
BASOPHILS NFR BLD AUTO: 0.5 %
BILIRUB SERPL-MCNC: 0.4 MG/DL (ref 0–1.2)
BUN SERPL-MCNC: 7 MG/DL (ref 6–23)
CALCIUM SERPL-MCNC: 8.9 MG/DL (ref 8.6–10.3)
CHLORIDE SERPL-SCNC: 106 MMOL/L (ref 98–107)
CHOLEST SERPL-MCNC: 201 MG/DL (ref 0–199)
CHOLESTEROL/HDL RATIO: 5.7
CO2 SERPL-SCNC: 26 MMOL/L (ref 21–32)
CREAT SERPL-MCNC: 0.82 MG/DL (ref 0.5–1.05)
EGFRCR SERPLBLD CKD-EPI 2021: 88 ML/MIN/1.73M*2
EOSINOPHIL # BLD AUTO: 0.1 X10*3/UL (ref 0–0.7)
EOSINOPHIL NFR BLD AUTO: 1.6 %
ERYTHROCYTE [DISTWIDTH] IN BLOOD BY AUTOMATED COUNT: 13.6 % (ref 11.5–14.5)
EST. AVERAGE GLUCOSE BLD GHB EST-MCNC: 97 MG/DL
GLUCOSE SERPL-MCNC: 78 MG/DL (ref 74–99)
HBA1C MFR BLD: 5 %
HCT VFR BLD AUTO: 45.4 % (ref 36–46)
HDLC SERPL-MCNC: 35 MG/DL
HGB BLD-MCNC: 15 G/DL (ref 12–16)
IMM GRANULOCYTES # BLD AUTO: 0.01 X10*3/UL (ref 0–0.7)
IMM GRANULOCYTES NFR BLD AUTO: 0.2 % (ref 0–0.9)
IRON SATN MFR SERPL: 42 % (ref 25–45)
IRON SERPL-MCNC: 106 UG/DL (ref 35–150)
LDLC SERPL CALC-MCNC: 124 MG/DL
LYMPHOCYTES # BLD AUTO: 1.89 X10*3/UL (ref 1.2–4.8)
LYMPHOCYTES NFR BLD AUTO: 30.9 %
MCH RBC QN AUTO: 29.8 PG (ref 26–34)
MCHC RBC AUTO-ENTMCNC: 33 G/DL (ref 32–36)
MCV RBC AUTO: 90 FL (ref 80–100)
MONOCYTES # BLD AUTO: 0.31 X10*3/UL (ref 0.1–1)
MONOCYTES NFR BLD AUTO: 5.1 %
NEUTROPHILS # BLD AUTO: 3.78 X10*3/UL (ref 1.2–7.7)
NEUTROPHILS NFR BLD AUTO: 61.7 %
NON HDL CHOLESTEROL: 166 MG/DL (ref 0–149)
NRBC BLD-RTO: 0 /100 WBCS (ref 0–0)
PLATELET # BLD AUTO: 147 X10*3/UL (ref 150–450)
POTASSIUM SERPL-SCNC: 4.1 MMOL/L (ref 3.5–5.3)
PROT SERPL-MCNC: 6.2 G/DL (ref 6.4–8.2)
PTH-INTACT SERPL-MCNC: 59.4 PG/ML (ref 18.5–88)
RBC # BLD AUTO: 5.04 X10*6/UL (ref 4–5.2)
SODIUM SERPL-SCNC: 137 MMOL/L (ref 136–145)
TIBC SERPL-MCNC: 252 UG/DL (ref 240–445)
TRIGL SERPL-MCNC: 208 MG/DL (ref 0–149)
TSH SERPL-ACNC: 1.06 MIU/L (ref 0.44–3.98)
UIBC SERPL-MCNC: 146 UG/DL (ref 110–370)
VIT B12 SERPL-MCNC: 464 PG/ML (ref 211–911)
VLDL: 42 MG/DL (ref 0–40)
WBC # BLD AUTO: 6.1 X10*3/UL (ref 4.4–11.3)

## 2024-05-24 PROCEDURE — 83036 HEMOGLOBIN GLYCOSYLATED A1C: CPT

## 2024-05-24 PROCEDURE — 83540 ASSAY OF IRON: CPT

## 2024-05-24 PROCEDURE — 85025 COMPLETE CBC W/AUTO DIFF WBC: CPT

## 2024-05-24 PROCEDURE — 82607 VITAMIN B-12: CPT

## 2024-05-24 PROCEDURE — 84443 ASSAY THYROID STIM HORMONE: CPT

## 2024-05-24 PROCEDURE — 83550 IRON BINDING TEST: CPT

## 2024-05-24 PROCEDURE — 36415 COLL VENOUS BLD VENIPUNCTURE: CPT

## 2024-05-24 PROCEDURE — 80053 COMPREHEN METABOLIC PANEL: CPT

## 2024-05-24 PROCEDURE — 80061 LIPID PANEL: CPT

## 2024-05-24 PROCEDURE — 82306 VITAMIN D 25 HYDROXY: CPT

## 2024-05-24 PROCEDURE — 83970 ASSAY OF PARATHORMONE: CPT

## 2024-05-28 ENCOUNTER — OFFICE VISIT (OUTPATIENT)
Dept: PRIMARY CARE | Facility: CLINIC | Age: 49
End: 2024-05-28
Payer: COMMERCIAL

## 2024-05-28 VITALS
DIASTOLIC BLOOD PRESSURE: 82 MMHG | HEART RATE: 88 BPM | BODY MASS INDEX: 26.88 KG/M2 | SYSTOLIC BLOOD PRESSURE: 122 MMHG | WEIGHT: 164 LBS

## 2024-05-28 DIAGNOSIS — E78.1 HYPERTRIGLYCERIDEMIA: ICD-10-CM

## 2024-05-28 DIAGNOSIS — E53.8 FOLATE DEFICIENCY: ICD-10-CM

## 2024-05-28 DIAGNOSIS — E61.1 LOW IRON: ICD-10-CM

## 2024-05-28 DIAGNOSIS — L84 CALLUS: Primary | ICD-10-CM

## 2024-05-28 DIAGNOSIS — E55.9 VITAMIN D DEFICIENCY: ICD-10-CM

## 2024-05-28 DIAGNOSIS — F17.200 NICOTINE DEPENDENCE, UNSPECIFIED, UNCOMPLICATED: ICD-10-CM

## 2024-05-28 DIAGNOSIS — D69.6 LOW PLATELET COUNT (CMS-HCC): ICD-10-CM

## 2024-05-28 DIAGNOSIS — J30.2 SEASONAL ALLERGIES: ICD-10-CM

## 2024-05-28 DIAGNOSIS — E53.8 VITAMIN B12 DEFICIENCY: ICD-10-CM

## 2024-05-28 DIAGNOSIS — N32.81 OVERACTIVE BLADDER: ICD-10-CM

## 2024-05-28 PROBLEM — Z79.899 MEDICATION MANAGEMENT: Status: ACTIVE | Noted: 2024-05-28

## 2024-05-28 PROCEDURE — 99214 OFFICE O/P EST MOD 30 MIN: CPT | Performed by: NURSE PRACTITIONER

## 2024-05-28 RX ORDER — FERROUS SULFATE, DRIED 160(50) MG
1 TABLET, EXTENDED RELEASE ORAL 2 TIMES DAILY
Qty: 180 TABLET | Refills: 3 | Status: SHIPPED | OUTPATIENT
Start: 2024-05-28

## 2024-05-28 RX ORDER — OXYBUTYNIN CHLORIDE 10 MG/1
10 TABLET, EXTENDED RELEASE ORAL
Qty: 90 TABLET | Refills: 3 | Status: SHIPPED | OUTPATIENT
Start: 2024-05-28 | End: 2024-05-28 | Stop reason: ALTCHOICE

## 2024-05-28 RX ORDER — BUPROPION HYDROCHLORIDE 150 MG/1
150 TABLET, FILM COATED, EXTENDED RELEASE ORAL EVERY 12 HOURS
Qty: 60 TABLET | Refills: 5 | Status: SHIPPED | OUTPATIENT
Start: 2024-05-28

## 2024-05-28 RX ORDER — FOLIC ACID 1 MG/1
1 TABLET ORAL DAILY
Qty: 90 TABLET | Refills: 3 | Status: SHIPPED | OUTPATIENT
Start: 2024-05-28

## 2024-05-28 RX ORDER — LANOLIN ALCOHOL/MO/W.PET/CERES
1000 CREAM (GRAM) TOPICAL EVERY OTHER DAY
Qty: 90 TABLET | Refills: 3 | Status: SHIPPED | OUTPATIENT
Start: 2024-05-28

## 2024-05-28 RX ORDER — FERROUS SULFATE 325(65) MG
1 TABLET ORAL DAILY
Qty: 90 TABLET | Refills: 3 | Status: SHIPPED | OUTPATIENT
Start: 2024-05-28

## 2024-05-28 RX ORDER — LORATADINE 10 MG/1
10 TABLET ORAL DAILY
Qty: 90 TABLET | Refills: 3 | Status: SHIPPED | OUTPATIENT
Start: 2024-05-28 | End: 2025-05-28

## 2024-05-28 RX ORDER — FENOFIBRATE 48 MG/1
48 TABLET, FILM COATED ORAL DAILY
Qty: 90 TABLET | Refills: 3 | Status: SHIPPED | OUTPATIENT
Start: 2024-05-28 | End: 2025-05-28

## 2024-05-28 ASSESSMENT — ENCOUNTER SYMPTOMS
FEVER: 0
DIFFICULTY URINATING: 0
JOINT SWELLING: 0
WEAKNESS: 0
CHILLS: 0
DYSURIA: 0
HEMATURIA: 0
APNEA: 0
FATIGUE: 0
EYE PAIN: 0
FREQUENCY: 0
SHORTNESS OF BREATH: 0
CONSTIPATION: 0
NECK PAIN: 0
BACK PAIN: 0
SLEEP DISTURBANCE: 0
WHEEZING: 0
ABDOMINAL DISTENTION: 0
SORE THROAT: 0
VOMITING: 0
FACIAL ASYMMETRY: 0
CHEST TIGHTNESS: 0
COUGH: 0
WOUND: 0
HEADACHES: 0
PALPITATIONS: 0
MYALGIAS: 0
TROUBLE SWALLOWING: 0
DIARRHEA: 0
ABDOMINAL PAIN: 0
UNEXPECTED WEIGHT CHANGE: 0
SPEECH DIFFICULTY: 0
FLANK PAIN: 0
NAUSEA: 0
ARTHRALGIAS: 0
BLOOD IN STOOL: 0
CONFUSION: 0
CHOKING: 0
PHOTOPHOBIA: 0
NUMBNESS: 0
NERVOUS/ANXIOUS: 0
DIZZINESS: 0
SEIZURES: 0
EYE REDNESS: 0

## 2024-05-28 ASSESSMENT — PATIENT HEALTH QUESTIONNAIRE - PHQ9
2. FEELING DOWN, DEPRESSED OR HOPELESS: NOT AT ALL
SUM OF ALL RESPONSES TO PHQ9 QUESTIONS 1 AND 2: 0
1. LITTLE INTEREST OR PLEASURE IN DOING THINGS: NOT AT ALL

## 2024-05-28 NOTE — PROGRESS NOTES
"Subjective   Patient ID: Africa Gastelum \"Mckayla" is a 48 y.o. female who presents for Follow-up (6 months labs . Pt c/o thick toenail  and callus ).    HPI:  Presents today for 6 MONTH [facility] LABS. C/O THICK TOE NAILS X SEVERAL WEEKS- MONTHS  modifying factors consists of NONE associated symptoms consist of CALLUS  prior treatment consists of medication NONE      PLATELETS- LOW. REFUSING HEME REFERRAL AT THIS TIME. WILL RECHECK IN 1 MONTH   CKD- STABLE  IRON- STABLE  B12- STABLE  HYPERTRIGLYCERIDEMIA- ELEVATED. START FENOFIBRATE. RECHECK IN 6 MONTHS       Visit Vitals  /82 (BP Location: Right arm, Patient Position: Sitting)   Pulse 88   Wt 74.4 kg (164 lb)   BMI 26.88 kg/m²   OB Status Having periods   Smoking Status Every Day   BSA 1.85 m²        Review of Systems   Constitutional:  Negative for chills, fatigue, fever and unexpected weight change.   HENT:  Negative for congestion, ear pain, sore throat and trouble swallowing.    Eyes:  Negative for photophobia, pain, redness and visual disturbance.   Respiratory:  Negative for apnea, cough, choking, chest tightness, shortness of breath and wheezing.    Cardiovascular:  Negative for chest pain, palpitations and leg swelling.   Gastrointestinal:  Negative for abdominal distention, abdominal pain, blood in stool, constipation, diarrhea, nausea and vomiting.   Genitourinary:  Negative for difficulty urinating, dysuria, flank pain, frequency, hematuria and urgency.   Musculoskeletal:  Negative for arthralgias, back pain, gait problem, joint swelling, myalgias and neck pain.   Skin:  Negative for rash and wound.   Neurological:  Negative for dizziness, seizures, syncope, facial asymmetry, speech difficulty, weakness, numbness and headaches.   Psychiatric/Behavioral:  Negative for confusion, sleep disturbance and suicidal ideas. The patient is not nervous/anxious.        Objective   Component      Latest Ref Rng 5/24/2024   LEUKOCYTES (10*3/UL) IN BLOOD BY AUTOMATED " COUNT, [facility]      4.4 - 11.3 x10*3/uL 6.1    nRBC      0.0 - 0.0 /100 WBCs 0.0    ERYTHROCYTES (10*6/UL) IN BLOOD BY AUTOMATED COUNT, [facility]      4.00 - 5.20 x10*6/uL 5.04    HEMOGLOBIN      12.0 - 16.0 g/dL 15.0    HEMATOCRIT      36.0 - 46.0 % 45.4    MCV      80 - 100 fL 90    MCH      26.0 - 34.0 pg 29.8    MCHC      32.0 - 36.0 g/dL 33.0    RED CELL DISTRIBUTION WIDTH      11.5 - 14.5 % 13.6    PLATELETS (10*3/UL) IN BLOOD AUTOMATED COUNT, [facility]      150 - 450 x10*3/uL 147 (L)    NEUTROPHILS/100 LEUKOCYTES IN BLOOD BY AUTOMATED COUNT, [facility]      40.0 - 80.0 % 61.7    Immature Granulocytes %, Automated      0.0 - 0.9 % 0.2    Lymphocytes %      13.0 - 44.0 % 30.9    Monocytes %      2.0 - 10.0 % 5.1    Eosinophils %      0.0 - 6.0 % 1.6    Basophils %      0.0 - 2.0 % 0.5    NEUTROPHILS (10*3/UL) IN BLOOD BY AUTOMATED COUNT, [facility]      1.20 - 7.70 x10*3/uL 3.78    Immature Granulocytes Absolute, Automated      0.00 - 0.70 x10*3/uL 0.01    Lymphocytes Absolute      1.20 - 4.80 x10*3/uL 1.89    Monocytes Absolute      0.10 - 1.00 x10*3/uL 0.31    Eosinophils Absolute      0.00 - 0.70 x10*3/uL 0.10    Basophils Absolute      0.00 - 0.10 x10*3/uL 0.03    GLUCOSE      74 - 99 mg/dL 78    SODIUM      136 - 145 mmol/L 137    POTASSIUM      3.5 - 5.3 mmol/L 4.1    CHLORIDE      98 - 107 mmol/L 106    Bicarbonate      21 - 32 mmol/L 26    Anion Gap      10 - 20 mmol/L 9 (L)    Blood Urea Nitrogen      6 - 23 mg/dL 7    Creatinine      0.50 - 1.05 mg/dL 0.82    EGFR      >60 mL/min/1.73m*2 88    Calcium      8.6 - 10.3 mg/dL 8.9    Albumin      3.4 - 5.0 g/dL 3.7    Alkaline Phosphatase      33 - 110 U/L 90    Total Protein      6.4 - 8.2 g/dL 6.2 (L)    AST      9 - 39 U/L 13    Bilirubin Total      0.0 - 1.2 mg/dL 0.4    ALT      7 - 45 U/L 10    CHOLESTEROL      0 - 199 mg/dL 201 (H)    HDL CHOLESTEROL      mg/dL 35.0    Cholesterol/HDL Ratio 5.7    LDL Calculated      <=99 mg/dL 124 (H)    VLDL      0 -  40 mg/dL 42 (H)    TRIGLYCERIDES      0 - 149 mg/dL 208 (H)    Non HDL Cholesterol      0 - 149 mg/dL 166 (H)    IRON      35 - 150 ug/dL 106    UIBC      110 - 370 ug/dL 146    TIBC      240 - 445 ug/dL 252    % Saturation      25 - 45 % 42    Hemoglobin A1C      see below % 5.0    Estimated Average Glucose      Not Established mg/dL 97    Vitamin B12      211 - 911 pg/mL 464    Thyroid Stimulating Hormone      0.44 - 3.98 mIU/L 1.06    Parathyroid Hormone, Intact      18.5 - 88.0 pg/mL 59.4    Vitamin D, 25-Hydroxy, Total      30 - 100 ng/mL 34       Legend:  (L) Low  (H) High  Physical Exam  Constitutional:       Appearance: Normal appearance. She is normal weight.   HENT:      Head: Normocephalic.   Eyes:      Extraocular Movements: Extraocular movements intact.      Conjunctiva/sclera: Conjunctivae normal.      Pupils: Pupils are equal, round, and reactive to light.   Cardiovascular:      Rate and Rhythm: Normal rate and regular rhythm.      Pulses: Normal pulses.      Heart sounds: Normal heart sounds.   Pulmonary:      Effort: Pulmonary effort is normal.      Breath sounds: Normal breath sounds.   Musculoskeletal:         General: Normal range of motion.      Cervical back: Normal range of motion.   Skin:     General: Skin is warm and dry.   Neurological:      General: No focal deficit present.      Mental Status: She is alert and oriented to person, place, and time.   Psychiatric:         Mood and Affect: Mood normal.         Behavior: Behavior normal.         Thought Content: Thought content normal.         Judgment: Judgment normal.            Assessment/Plan   Problem List Items Addressed This Visit       Folate deficiency    Relevant Medications    folic acid (Folvite) 1 mg tablet    Low iron    Relevant Medications    ferrous sulfate, 325 mg ferrous sulfate, tablet    Other Relevant Orders    Iron and TIBC    Overactive bladder    Vitamin B12 deficiency    Relevant Medications    cyanocobalamin (Vitamin  B-12) 1,000 mcg tablet    Other Relevant Orders    Vitamin B12    Vitamin D deficiency    Relevant Medications    calcium carbonate-vitamin D3 (Hi-Alek Plus Vit D) 500 mg-5 mcg (200 unit) tablet    Other Relevant Orders    Vitamin D 25-Hydroxy,Total (for eval of Vitamin D levels)    Parathyroid Hormone, Intact    Hypertriglyceridemia    Relevant Medications    fenofibrate (Tricor) 48 mg tablet    Other Relevant Orders    Comprehensive Metabolic Panel    Hemoglobin A1C    Lipid Panel    TSH with reflex to Free T4 if abnormal    Callus - Primary    Relevant Orders    Referral to Podiatry    Seasonal allergies    Relevant Medications    loratadine (Claritin) 10 mg tablet    Low platelet count (CMS-HCC)    Relevant Orders    CBC and Auto Differential    CBC and Auto Differential     Other Visit Diagnoses       Nicotine dependence, unspecified, uncomplicated        Relevant Medications    buPROPion (Zyban) 150 mg 12 hr tablet            WE DISCUSSED MOST COMMON SIDE EFFECTS OF PRESCRIBED MEDICATIONS. INDICATIONS, RISK, COMPLICATIONS, AND ALTERNATIVES OF MEDICATION/THERAPEUTICS WERE EXPLAINED AND DISCUSSED. PLEASE MONITOR CLOSELY FOR ANY UNTOWARD SIDE EFFECTS OR COMPLICATIONS OF MEDICATIONS. PATIENT IS STRONGLY ADVISED TO BE COMPLIANT WITH RECOMMENDATIONS. QUESTIONS AND CONCERNS WERE ADDRESSED. INSTRUCTED TO CALL, RETURN SOONER, OR GO TO THE ER,  IF SYMPTOMS PERSIST OR WORSEN. THEY VOICED UNDERSTANDING AND  DENIES FURTHER QUESTIONS AT THIS TIME.    TIME CODE  1. PREPARATION FOR PATIENT'S VISIT (REVIEWING CHART, CURRENT MEDICAL RECORDS, OUTSIDE HEALTH PROVIDER RECORDS, PREVIOUS HISTORY, EXAM, TEST, PROCEDURE, AND MEDICATIONS)  2. FACE TO FACE ENCOUNTER OBTAINING HISTORY FROM THE PATIENT/FAMILY/CAREGIVERS; PERFORMING EVALUATION AND EXAMINATION; ORDERING TESTS OR PROCEDURES; REFERRING AND COMMUNICATING WITH OTHER HEALTHCARE PROVIDERS; COUNSELING AND EDUCATION OF THE PATIENT/FAMILY/CAREGIVERS; INDEPENDENTLY INTERPRETING  RESULTS (TESTS, LABS, PROCEDURES, IMAGING) AND COMMUNICATING AND EXPLAINING RESULTS TO THE PATIENT/FAMILY/CAREGIVERS  3. COORDINATION OF CARE; PREPARING AND PRINTING DISCHARGE INSTRUCTIONS AND ANY EDUCATIONAL MATERIAL FOR THE PATIENT/FAMILY/CAREGIVERS. DOCUMENTING CLINICAL INFORMATION IN THE ELECTRONIC MEDICAL RECORD   4. REVIEWING OARRS AS NEEDED    MDM  1) COMPLEXITY: MORE THAN 1 STABLE CHRONIC CONDITION ADDRESSED OR 1 ACUTE ILLNESS ADDRESSED   2)DATA: TESTS INTERPRETED AND OR ORDERED, TOOK INDEPENDENT HISTORY OR RECORDS REVIEWED  3)RISK: MODERATE RISK DUE TO NATURE OF MEDICAL CONDITIONS/COMORBIDITY OR MEDICATIONS ORDERED OR SURGICAL OR PROCEDURE REFERRAL    6 MONTHS

## 2024-07-05 ENCOUNTER — LAB (OUTPATIENT)
Dept: LAB | Facility: LAB | Age: 49
End: 2024-07-05
Payer: COMMERCIAL

## 2024-07-05 DIAGNOSIS — D69.6 LOW PLATELET COUNT (CMS-HCC): ICD-10-CM

## 2024-07-05 LAB
BASOPHILS # BLD AUTO: 0.05 X10*3/UL (ref 0–0.1)
BASOPHILS NFR BLD AUTO: 0.7 %
EOSINOPHIL # BLD AUTO: 0.09 X10*3/UL (ref 0–0.7)
EOSINOPHIL NFR BLD AUTO: 1.3 %
ERYTHROCYTE [DISTWIDTH] IN BLOOD BY AUTOMATED COUNT: 13.3 % (ref 11.5–14.5)
HCT VFR BLD AUTO: 46.1 % (ref 36–46)
HGB BLD-MCNC: 15.4 G/DL (ref 12–16)
IMM GRANULOCYTES # BLD AUTO: 0.02 X10*3/UL (ref 0–0.7)
IMM GRANULOCYTES NFR BLD AUTO: 0.3 % (ref 0–0.9)
LYMPHOCYTES # BLD AUTO: 2.3 X10*3/UL (ref 1.2–4.8)
LYMPHOCYTES NFR BLD AUTO: 32.7 %
MCH RBC QN AUTO: 29.8 PG (ref 26–34)
MCHC RBC AUTO-ENTMCNC: 33.4 G/DL (ref 32–36)
MCV RBC AUTO: 89 FL (ref 80–100)
MONOCYTES # BLD AUTO: 0.4 X10*3/UL (ref 0.1–1)
MONOCYTES NFR BLD AUTO: 5.7 %
NEUTROPHILS # BLD AUTO: 4.18 X10*3/UL (ref 1.2–7.7)
NEUTROPHILS NFR BLD AUTO: 59.3 %
NRBC BLD-RTO: 0 /100 WBCS (ref 0–0)
PLATELET # BLD AUTO: 148 X10*3/UL (ref 150–450)
RBC # BLD AUTO: 5.17 X10*6/UL (ref 4–5.2)
WBC # BLD AUTO: 7 X10*3/UL (ref 4.4–11.3)

## 2024-07-05 PROCEDURE — 36415 COLL VENOUS BLD VENIPUNCTURE: CPT

## 2024-07-05 PROCEDURE — 85025 COMPLETE CBC W/AUTO DIFF WBC: CPT

## 2024-07-09 ENCOUNTER — TELEPHONE (OUTPATIENT)
Dept: PRIMARY CARE | Facility: CLINIC | Age: 49
End: 2024-07-09
Payer: COMMERCIAL

## 2024-07-09 DIAGNOSIS — D69.6 LOW PLATELET COUNT (CMS-HCC): Primary | ICD-10-CM

## 2024-07-09 NOTE — TELEPHONE ENCOUNTER
PLATELET LEVEL REMAINS ELEVATED. SHE IS WILING TO SEE HEME. REFERRAL IS IN. PLEASE SCHEDULE. THANK YOU

## 2024-08-04 DIAGNOSIS — E61.1 LOW IRON: ICD-10-CM

## 2024-08-04 DIAGNOSIS — E55.9 VITAMIN D DEFICIENCY: ICD-10-CM

## 2024-08-04 DIAGNOSIS — R06.02 SHORTNESS OF BREATH ON EXERTION: ICD-10-CM

## 2024-08-04 DIAGNOSIS — E53.8 VITAMIN B12 DEFICIENCY: ICD-10-CM

## 2024-08-04 DIAGNOSIS — E53.8 FOLATE DEFICIENCY: ICD-10-CM

## 2024-08-05 RX ORDER — FERROUS SULFATE 325(65) MG
1 TABLET ORAL DAILY
Qty: 30 TABLET | Refills: 11 | Status: SHIPPED | OUTPATIENT
Start: 2024-08-05

## 2024-08-05 RX ORDER — LANOLIN ALCOHOL/MO/W.PET/CERES
1000 CREAM (GRAM) TOPICAL EVERY OTHER DAY
Qty: 45 TABLET | Refills: 7 | Status: SHIPPED | OUTPATIENT
Start: 2024-08-05

## 2024-08-05 RX ORDER — LANOLIN ALCOHOL/MO/W.PET/CERES
1 CREAM (GRAM) TOPICAL 2 TIMES DAILY
Qty: 60 TABLET | Refills: 11 | Status: SHIPPED | OUTPATIENT
Start: 2024-08-05

## 2024-08-05 RX ORDER — FOLIC ACID 1 MG/1
1 TABLET ORAL DAILY
Qty: 90 TABLET | Refills: 3 | Status: SHIPPED | OUTPATIENT
Start: 2024-08-05

## 2024-08-06 RX ORDER — ALBUTEROL SULFATE 90 UG/1
2 INHALANT RESPIRATORY (INHALATION) EVERY 6 HOURS PRN
Qty: 18 G | Refills: 11 | Status: SHIPPED | OUTPATIENT
Start: 2024-08-06

## 2024-08-14 ENCOUNTER — APPOINTMENT (OUTPATIENT)
Dept: HEMATOLOGY/ONCOLOGY | Facility: CLINIC | Age: 49
End: 2024-08-14
Payer: COMMERCIAL

## 2024-08-15 ENCOUNTER — OFFICE VISIT (OUTPATIENT)
Dept: HEMATOLOGY/ONCOLOGY | Facility: CLINIC | Age: 49
End: 2024-08-15
Payer: COMMERCIAL

## 2024-08-15 VITALS
BODY MASS INDEX: 26.56 KG/M2 | HEART RATE: 78 BPM | DIASTOLIC BLOOD PRESSURE: 84 MMHG | SYSTOLIC BLOOD PRESSURE: 126 MMHG | WEIGHT: 159.4 LBS | HEIGHT: 65 IN | RESPIRATION RATE: 16 BRPM | TEMPERATURE: 97.3 F | OXYGEN SATURATION: 97 %

## 2024-08-15 DIAGNOSIS — D69.6 LOW PLATELET COUNT (CMS-HCC): ICD-10-CM

## 2024-08-15 LAB
ALBUMIN SERPL BCP-MCNC: 4.3 G/DL (ref 3.4–5)
ALP SERPL-CCNC: 78 U/L (ref 33–110)
ALT SERPL W P-5'-P-CCNC: 11 U/L (ref 7–45)
ANION GAP SERPL CALC-SCNC: 8 MMOL/L (ref 10–20)
AST SERPL W P-5'-P-CCNC: 12 U/L (ref 9–39)
BASOPHILS # BLD AUTO: 0.02 X10*3/UL (ref 0–0.1)
BASOPHILS NFR BLD AUTO: 0.3 %
BILIRUB SERPL-MCNC: 0.4 MG/DL (ref 0–1.2)
BUN SERPL-MCNC: 12 MG/DL (ref 6–23)
CALCIUM SERPL-MCNC: 9.4 MG/DL (ref 8.6–10.3)
CHLORIDE SERPL-SCNC: 104 MMOL/L (ref 98–107)
CO2 SERPL-SCNC: 28 MMOL/L (ref 21–32)
CREAT SERPL-MCNC: 0.97 MG/DL (ref 0.5–1.05)
CRP SERPL-MCNC: 0.45 MG/DL
EGFRCR SERPLBLD CKD-EPI 2021: 72 ML/MIN/1.73M*2
EOSINOPHIL # BLD AUTO: 0.04 X10*3/UL (ref 0–0.7)
EOSINOPHIL NFR BLD AUTO: 0.5 %
ERYTHROCYTE [DISTWIDTH] IN BLOOD BY AUTOMATED COUNT: 13.6 % (ref 11.5–14.5)
ERYTHROCYTE [SEDIMENTATION RATE] IN BLOOD BY WESTERGREN METHOD: 16 MM/H (ref 0–20)
FERRITIN SERPL-MCNC: 244 NG/ML (ref 8–150)
GLUCOSE SERPL-MCNC: 75 MG/DL (ref 74–99)
HBV CORE AB SER QL: NONREACTIVE
HBV SURFACE AB SER-ACNC: <3.1 MIU/ML
HBV SURFACE AG SERPL QL IA: NONREACTIVE
HCT VFR BLD AUTO: 46.4 % (ref 36–46)
HCV AB SER QL: NONREACTIVE
HGB BLD-MCNC: 15.7 G/DL (ref 12–16)
HGB RETIC QN: 34 PG (ref 28–38)
HIV 1+2 AB+HIV1 P24 AG SERPL QL IA: NONREACTIVE
IMM GRANULOCYTES # BLD AUTO: 0.02 X10*3/UL (ref 0–0.7)
IMM GRANULOCYTES NFR BLD AUTO: 0.3 % (ref 0–0.9)
IMMATURE RETIC FRACTION: 5.1 %
IRON SATN MFR SERPL: 36 % (ref 25–45)
IRON SERPL-MCNC: 105 UG/DL (ref 35–150)
LDH SERPL L TO P-CCNC: 216 U/L (ref 84–246)
LYMPHOCYTES # BLD AUTO: 2.08 X10*3/UL (ref 1.2–4.8)
LYMPHOCYTES NFR BLD AUTO: 27.5 %
MCH RBC QN AUTO: 30.4 PG (ref 26–34)
MCHC RBC AUTO-ENTMCNC: 33.8 G/DL (ref 32–36)
MCV RBC AUTO: 90 FL (ref 80–100)
MONOCYTES # BLD AUTO: 0.42 X10*3/UL (ref 0.1–1)
MONOCYTES NFR BLD AUTO: 5.5 %
NEUTROPHILS # BLD AUTO: 4.99 X10*3/UL (ref 1.2–7.7)
NEUTROPHILS NFR BLD AUTO: 65.9 %
NRBC BLD-RTO: 0 /100 WBCS (ref 0–0)
PLATELET # BLD AUTO: 141 X10*3/UL (ref 150–450)
POTASSIUM SERPL-SCNC: 3.9 MMOL/L (ref 3.5–5.3)
PROT SERPL-MCNC: 6.8 G/DL (ref 6.4–8.2)
RBC # BLD AUTO: 5.16 X10*6/UL (ref 4–5.2)
RETICS #: 0.13 X10*6/UL (ref 0.02–0.08)
RETICS/RBC NFR AUTO: 2.5 % (ref 0.5–2)
SODIUM SERPL-SCNC: 136 MMOL/L (ref 136–145)
TIBC SERPL-MCNC: 288 UG/DL (ref 240–445)
TSH SERPL-ACNC: 1.43 MIU/L (ref 0.44–3.98)
UIBC SERPL-MCNC: 183 UG/DL (ref 110–370)
URATE SERPL-MCNC: 4.3 MG/DL (ref 2.3–6.7)
VIT B12 SERPL-MCNC: 441 PG/ML (ref 211–911)
WBC # BLD AUTO: 7.6 X10*3/UL (ref 4.4–11.3)

## 2024-08-15 PROCEDURE — 84443 ASSAY THYROID STIM HORMONE: CPT

## 2024-08-15 PROCEDURE — 85652 RBC SED RATE AUTOMATED: CPT

## 2024-08-15 PROCEDURE — 87340 HEPATITIS B SURFACE AG IA: CPT | Mod: SAMLAB

## 2024-08-15 PROCEDURE — 3008F BODY MASS INDEX DOCD: CPT

## 2024-08-15 PROCEDURE — 86140 C-REACTIVE PROTEIN: CPT

## 2024-08-15 PROCEDURE — 82607 VITAMIN B-12: CPT

## 2024-08-15 PROCEDURE — 85025 COMPLETE CBC W/AUTO DIFF WBC: CPT

## 2024-08-15 PROCEDURE — 83615 LACTATE (LD) (LDH) ENZYME: CPT

## 2024-08-15 PROCEDURE — 86706 HEP B SURFACE ANTIBODY: CPT | Mod: SAMLAB

## 2024-08-15 PROCEDURE — 82728 ASSAY OF FERRITIN: CPT

## 2024-08-15 PROCEDURE — 80053 COMPREHEN METABOLIC PANEL: CPT

## 2024-08-15 PROCEDURE — 84550 ASSAY OF BLOOD/URIC ACID: CPT

## 2024-08-15 PROCEDURE — 99215 OFFICE O/P EST HI 40 MIN: CPT

## 2024-08-15 PROCEDURE — 87389 HIV-1 AG W/HIV-1&-2 AB AG IA: CPT | Mod: SAMLAB

## 2024-08-15 PROCEDURE — 99205 OFFICE O/P NEW HI 60 MIN: CPT

## 2024-08-15 PROCEDURE — 83540 ASSAY OF IRON: CPT

## 2024-08-15 PROCEDURE — 85045 AUTOMATED RETICULOCYTE COUNT: CPT

## 2024-08-15 PROCEDURE — 86704 HEP B CORE ANTIBODY TOTAL: CPT | Mod: SAMLAB

## 2024-08-15 PROCEDURE — 86803 HEPATITIS C AB TEST: CPT | Mod: SAMLAB

## 2024-08-15 PROCEDURE — 36415 COLL VENOUS BLD VENIPUNCTURE: CPT

## 2024-08-15 ASSESSMENT — COLUMBIA-SUICIDE SEVERITY RATING SCALE - C-SSRS
6. HAVE YOU EVER DONE ANYTHING, STARTED TO DO ANYTHING, OR PREPARED TO DO ANYTHING TO END YOUR LIFE?: NO
1. IN THE PAST MONTH, HAVE YOU WISHED YOU WERE DEAD OR WISHED YOU COULD GO TO SLEEP AND NOT WAKE UP?: NO
2. HAVE YOU ACTUALLY HAD ANY THOUGHTS OF KILLING YOURSELF?: NO

## 2024-08-15 ASSESSMENT — PATIENT HEALTH QUESTIONNAIRE - PHQ9
8. MOVING OR SPEAKING SO SLOWLY THAT OTHER PEOPLE COULD HAVE NOTICED. OR THE OPPOSITE, BEING SO FIGETY OR RESTLESS THAT YOU HAVE BEEN MOVING AROUND A LOT MORE THAN USUAL: NOT AT ALL
2. FEELING DOWN, DEPRESSED OR HOPELESS: NEARLY EVERY DAY
SUM OF ALL RESPONSES TO PHQ QUESTIONS 1-9: 18
3. TROUBLE FALLING OR STAYING ASLEEP OR SLEEPING TOO MUCH: NEARLY EVERY DAY
6. FEELING BAD ABOUT YOURSELF - OR THAT YOU ARE A FAILURE OR HAVE LET YOURSELF OR YOUR FAMILY DOWN: NEARLY EVERY DAY
7. TROUBLE CONCENTRATING ON THINGS, SUCH AS READING THE NEWSPAPER OR WATCHING TELEVISION: NOT AT ALL
4. FEELING TIRED OR HAVING LITTLE ENERGY: NEARLY EVERY DAY
10. IF YOU CHECKED OFF ANY PROBLEMS, HOW DIFFICULT HAVE THESE PROBLEMS MADE IT FOR YOU TO DO YOUR WORK, TAKE CARE OF THINGS AT HOME, OR GET ALONG WITH OTHER PEOPLE: VERY DIFFICULT
1. LITTLE INTEREST OR PLEASURE IN DOING THINGS: NEARLY EVERY DAY
5. POOR APPETITE OR OVEREATING: NEARLY EVERY DAY
9. THOUGHTS THAT YOU WOULD BE BETTER OFF DEAD, OR OF HURTING YOURSELF: NOT AT ALL
SUM OF ALL RESPONSES TO PHQ9 QUESTIONS 1 AND 2: 6

## 2024-08-15 ASSESSMENT — PAIN SCALES - GENERAL: PAINLEVEL: 0-NO PAIN

## 2024-08-15 NOTE — PROGRESS NOTES
Pt scored a 18 on her depression score during intake today.KENNEY Funez notified  Labs drawn at clinic visit  Pt set up for rtc 8/29 230 for CNP visit  Reviewed AVS with patient- patient verbalizes understanding

## 2024-08-15 NOTE — PROGRESS NOTES
"Patient ID: Africa Gastelum \"Mckayla" is a 48 y.o. female.  Referring Physician: RUFINO Santana  [address] [place] [address]  Primary Care Provider: RUFINO Santana  Visit Type: Initial Visit    Diagnosis/Reason: Thrombocytopenia    HPI:  Africa Gastelum is a 48 y.o. female referred for consultation of thrombocytopenia.    Patient is office today with her friend. She was referred due to her platelets being low recently. She reports that her energy is poor, feels fatigued all the time. Able to work outside her home, but struggles completing tasks at home. Occasionally naps during the day. At night falls asleep easily, but unable to stay asleep, wakes around 4-5 am. Appetite is poor, barely able to eat one meal daily, mainly drinks pepsi, consumes very little water. She is under an immense amount of stress. Denies any weight loss. Chronic constipation, states will have episodes of formed stool followed by loose watery stool. Denies any hematochezia, or melanic stool. No abdominal pain or cramping. No urinary issues reported. Night sweats once every for the past few months. Shortness of breath with activity, especially stairs, walking, struggles catching her breath, using rescue inhalers couple times monthly. Bruises easily. Multiple painful lesions/lumps in her axillary region. Denies night sweats, fever. No recurrent infections or lymphadenopathy. No bone pain. No known blood disorders in family. Has had surgery in past w/o issue.     Patient reports history of Iron deficiency anemia. Post menopausal, reports amenorrhea over the past year.      Colonoscopy 2 years ago- 2 polyps found in ascending colon and sigmoid colon   MMG is due  Pap was last completed 5 years    Hepatitis/HIV  Recent virus/inf  up to date on cancer screenings        Menstrual Cycle History:  About how long do your periods last: no longer having menstural cycles   How would do you describe your flow during your period: "   How frequently do you have to change your menstrual pads during your period: last cycle approximately 1 year ago    Previous Hematological Background:  Hx of hematological disorders: Yes - Patient reports a hematologic history of iron deficiency   Hx of blood transfusions: No - Patient denies prior blood transfusion  Hx of iron supplementation: Yes - Oral supplementation - Denies adverse effect and reaction - Agent: ferrous sulfate   Hx of B12 supplementation: Yes - Prior oral supplementation - Denies adverse effect and reaction  Hx of folate supplementation: Yes - Prior oral supplementation - Denies adverse effect and reaction    Relevant medications:  Currently using NSAID's (Naproxen, Ibuprofen etc.): occasional   Currently taking any supplements: none        PMHx:  Active Ambulatory Problems     Diagnosis Date Noted    Bilateral carotid artery stenosis 03/15/2023    Borderline personality disorder (Multi) 03/15/2023    Chronic back pain 03/15/2023    Cigarette nicotine dependence 03/15/2023    Depression, major, single episode, moderate (Multi) 03/15/2023    Folate deficiency 03/15/2023    Generalized anxiety disorder with panic attacks 03/15/2023    Low esophagus 03/15/2023    GERD (gastroesophageal reflux disease) 03/15/2023    History of shingles 03/15/2023    Hypercholesterolemia 03/15/2023    Low iron 03/15/2023    Migraine 03/15/2023    Other intervertebral disc degeneration, lumbar region 03/15/2023    Overactive bladder 03/15/2023    Peptic ulcer 03/15/2023    Radiculopathy, lumbar region 03/15/2023    Right elbow tendinitis 03/15/2023    Hand eczema 03/15/2023    Vesicular foot eczema 03/15/2023    Vitamin B12 deficiency 03/15/2023    Urinary hesitancy 03/16/2023    Boils 03/16/2023    Vitamin D deficiency 05/04/2023    Constipation 05/04/2023    Colon cancer screening 05/04/2023    Chondromalacia patellae 03/24/2016    Chronic pain of left knee 11/11/2016    Depression 07/11/2023    Right ankle  pain 2016    Sciatica 2023    Syncopal episodes 2023    Atopic neurodermatitis 2020    Bone lesion 2023    Right ankle sprain 2023    Migraine, unspecified, not intractable, without status migrainosus 2023    Closed displaced fracture of cuboid bone of right foot 10/11/2023    Enchondroma of phalanx of foot 2023    Hypertriglyceridemia 2024    Callus 2024    Seasonal allergies 2024    Low platelet count (CMS-HCC) 2024    Medication management 2024     Resolved Ambulatory Problems     Diagnosis Date Noted    No Resolved Ambulatory Problems     Past Medical History:   Diagnosis Date    Immunization not carried out because of patient refusal     Other conditions influencing health status     Other specified postprocedural states     Other specified postprocedural states     Personal history of other medical treatment     Personal history of other specified conditions 2020          PSHx:  Past Surgical History:   Procedure Laterality Date     SECTION, CLASSIC  2001    COLONOSCOPY  2023    TUBLAR ADENOMA-REPEAT IN 3 YEARS    ESOPHAGOGASTRODUODENOSCOPY  2021    A.ANTRAL BX: GASTRIC ANTRAL-TYPE MUCOSA WITH REACTIVE GASTROPATHY. NO HELICOBACTER PYLORI SEEN ON ROUTINE STAINED SLIDES.  B.  BX DISTAL ESOPHAGUS: GASTRIC AND SQUAMOUS ESOPHAGEAL MUCOSA WITH FOCAL INTESTINAL METAPLASIA; NEGATIVE FOR DYSPLASIA. C. BX ESOPHAGUS: SQUAMOUS ESOPHAG    KNEE SURGERY  2005    X 4 SURGIES FIRST 2 WERE SCOPES, 3RD PLACED PINS AND 4TH WAS REMOVAL OF PINS    LAMINECTOMY  2006    SPINAL FUSION  2007    LATOSHA WAS PLACED    TUBAL LIGATION            FHx:  Family History   Problem Relation Name Age of Onset    Hypertension Mother      Hypertension Father      Colon cancer Father      Depression Brother      Colon cancer Maternal Grandmother      Hypertension Other GRANDPARENT     Lymphoma Other GRANDPARENT     Heart attack Other  "GRANDPARENT     Diabetes Other GRANDPARENT         Social Hx:  Africa Gastelum \"Daniel\"    reports that she has been smoking cigarettes. She has a 18.5 pack-year smoking history. She has never used smokeless tobacco.  She  reports that she does not currently use alcohol.  She  reports no history of drug use.  Social History     Socioeconomic History    Marital status:    Tobacco Use    Smoking status: Every Day     Current packs/day: 0.50     Average packs/day: 0.5 packs/day for 37.0 years (18.5 ttl pk-yrs)     Types: Cigarettes    Smokeless tobacco: Never   Vaping Use    Vaping status: Never Used   Substance and Sexual Activity    Alcohol use: Not Currently    Drug use: Never      Living Situation: with adult son and her friend Walker  Occupation: works at [employer]  Marital Status: - has 3 adult children  Alcohol Use: none  Smoking: ppd for 38 years   Recreational Drug Use: none  Special Diets: Regular    Medications and allergies reviewed in EMR.    ROS:  Review of Systems   Constitutional:  Positive for appetite change.   HENT:  Negative.     Eyes: Negative.    Respiratory:  Positive for shortness of breath.    Cardiovascular: Negative.    Gastrointestinal:  Positive for constipation.   Endocrine: Negative.    Genitourinary: Negative.     Musculoskeletal: Negative.    Skin: Negative.    Neurological: Negative.    Hematological:  Bruises/bleeds easily.   Psychiatric/Behavioral: Negative.        10 point review of systems negative except as state in HPI.    Vitals & Statistics:  Objective   BSA: 1.82 meters squared  /84 (BP Location: Right arm, Patient Position: Sitting, BP Cuff Size: Adult)   Pulse 78   Temp 36.3 °C (97.3 °F) (Skin)   Resp 16   Ht 1.654 m (5' 5.12\")   Wt 72.3 kg (159 lb 6.4 oz)   SpO2 97%   BMI 26.43 kg/m²     Physical Exam:  Physical Exam  Vitals and nursing note reviewed.   Constitutional:       Appearance: Normal appearance.   HENT:      Head: Normocephalic and " "atraumatic.      Nose: Nose normal.      Mouth/Throat:      Mouth: Mucous membranes are moist.      Pharynx: Oropharynx is clear.   Eyes:      General: No scleral icterus.     Extraocular Movements: Extraocular movements intact.      Conjunctiva/sclera: Conjunctivae normal.   Cardiovascular:      Rate and Rhythm: Normal rate and regular rhythm.      Pulses: Normal pulses.      Heart sounds: Normal heart sounds.   Pulmonary:      Effort: Pulmonary effort is normal.      Breath sounds: Normal breath sounds.   Abdominal:      General: Bowel sounds are normal.      Palpations: Abdomen is soft. There is no mass.      Tenderness: There is no abdominal tenderness.   Musculoskeletal:         General: Normal range of motion.      Cervical back: Normal range of motion.   Skin:     General: Skin is warm and dry.   Neurological:      General: No focal deficit present.      Mental Status: She is alert and oriented to person, place, and time.   Psychiatric:         Mood and Affect: Mood normal.         Behavior: Behavior normal.         Thought Content: Thought content normal.         Judgment: Judgment normal.           Results:  Lab Results   Component Value Date    WBC 7.0 07/05/2024    NEUTROABS 4.18 07/05/2024    IGABSOL 0.02 07/05/2024    LYMPHSABS 2.30 07/05/2024    MONOSABS 0.40 07/05/2024    EOSABS 0.09 07/05/2024    BASOSABS 0.05 07/05/2024    RBC 5.17 07/05/2024    MCV 89 07/05/2024    MCHC 33.4 07/05/2024    HGB 15.4 07/05/2024    HCT 46.1 (H) 07/05/2024     (L) 07/05/2024     No results found for: \"RETICCTPCT\"   Lab Results   Component Value Date    CREATININE 0.82 05/24/2024    BUN 7 05/24/2024    EGFR 88 05/24/2024     05/24/2024    K 4.1 05/24/2024     05/24/2024    CO2 26 05/24/2024      Lab Results   Component Value Date    ALT 10 05/24/2024    AST 13 05/24/2024    ALKPHOS 90 05/24/2024    BILITOT 0.4 05/24/2024      Lab Results   Component Value Date    TSH 1.06 05/24/2024     Lab Results " "  Component Value Date    TSH 1.06 05/24/2024     Lab Results   Component Value Date    IRON 106 05/24/2024    TIBC 252 05/24/2024    FERRITIN 151 (H) 11/04/2023      Lab Results   Component Value Date    CEPUQSNH57 464 05/24/2024      Lab Results   Component Value Date    FOLATE 19.3 11/04/2023     No results found for: \"DAVINA\", \"RF\", \"SEDRATE\"   No results found for: \"CRP\"   No results found for: \"JAC\"  No results found for: \"LDH\"  No results found for: \"HAPTOGLOBIN\"  No results found for: \"SPEP\"  No results found for: \"IGG\", \"IGM\", \"IGA\"  No results found for: \"HEPATOT\", \"HEPAIGM\", \"HEPBCIGM\", \"HEPBCAB\", \"HEPBSAG\", \"HEPCAB\"  No results found for: \"HIV1X2\"    Assessment:    Thrombocytopenia   - Low platelets since 5/2/23 ranging between 143-154k  - denies any abnormal bleeding or bruising  - poor energy, reported feeling fatigued all the time  - ROSSI with activity  - Occasional night sweats, no fevers or weightloss  - no recurrent infections  - post menopausal   - Discussed possible etiologies of thrombocytopenia including: vitamin deficiency, enlarged spleen/liver, autoimmune disease, infection, inflammatory disorder, allergies, collection tube clumping, etc.   -Will start hematological workup today.    2. Elevated Ferritin  - elevated ferritin single episode reported 11/2023 at 151  - Hematocrit slightly elevated multiple times since 3/2021 ranging from 45.1-50.2  - Hg has been reported elevated once on 5/2/23 at 16.6 otherwise ranges from 14.9-16.6  - Discussed possible etiologies of elevated ferritin including but not limited to well water/iron supplementation/diet, liver disease, recurrent blood transfusions, falsely elevated d/t inflammation/infection, and genetic disorder (ei hemachromatosis), etc.   - Will start hematological workup today for elevated ferritin.     I reviewed patient's chart including but not limited to labs, imaging, surgical/procedure notes, pathology, hospital notes, doctor's " notes.        Plan:  Labs today   RTC in 2-3 weeks to discuss labs       I had an extensive discussion with the patient regarding the diagnosis and discussed the plan of therapy, including general considerations regarding side effects and outcomes. Pt understood and gave appropriate teach back about the plan of care. All questions were answered to the patient's satisfaction. The patient is instructed to contact us at any time if questions or problems arise. Thank you for the opportunity to participate in the care of this very pleasant patient.    Total time = 60 minutes. 50% or more of this time was spent in counseling and/or coordination of care including reviewing medical history/radiology/labs, examining patient, formulating outlined plan with team, and discussing plan with patient/family.    Yessica Mayo, APRN-CNP

## 2024-08-20 ENCOUNTER — SOCIAL WORK (OUTPATIENT)
Dept: HEMATOLOGY/ONCOLOGY | Facility: CLINIC | Age: 49
End: 2024-08-20
Payer: COMMERCIAL

## 2024-08-21 NOTE — PROGRESS NOTES
This  reached out to this pt by phone due to low depression scores recorded by the team RN. The  called the pt by phone to discuss her struggles.   The pt reports that she is struggling right now with daily living expenses. She reports her gas is currently shut off and she owes $350. The pt reports she is the only one out of 3 who is currently working at this time. She reports her grown son lives with her and he is working on getting his GED and getting a job. The pt reports she is over income for food stamps and does utilize local food [place].   She reports using HEAP assistance in the past but had to end up paying all her rear charges. The SW educated the pt on the PIPP plan through [employer] and if the bill is paid in full monthly at a discounted rate, the rear charges go away.   The pt reports she is looking for a new place to live because her trailer lot rent is $650 a month (includes water) but her trailer,(she owns), is falling apart. The pt reports having no evictions and 2 dogs that are not dangerous breeds.   **The  emailed a list of resources to the pt to utilize. The SW will check in with this pt at her next visit to follow up.

## 2024-08-23 ASSESSMENT — ENCOUNTER SYMPTOMS
MUSCULOSKELETAL NEGATIVE: 1
SHORTNESS OF BREATH: 1
CONSTIPATION: 1
APPETITE CHANGE: 1
EYES NEGATIVE: 1
NEUROLOGICAL NEGATIVE: 1
CARDIOVASCULAR NEGATIVE: 1
BRUISES/BLEEDS EASILY: 1
PSYCHIATRIC NEGATIVE: 1
ENDOCRINE NEGATIVE: 1

## 2024-08-28 NOTE — PROGRESS NOTES
"Patient ID: Africa Gastelum \"Daniel\" is a 48 y.o. female.    Subjective    HPI  Patient ID: Africa Gastelum \"Daniel\" is a 48 y.o. female.  Referring Physician: RUFINO Santana  [address]  [place] [address]  Primary Care Provider: RUFINO Santana  Visit Type: Initial Visit     Diagnosis/Reason: Thrombocytopenia     HPI:  Africa Gastelum is a 48 y.o. female referred for consultation of thrombocytopenia.     Patient is office today with her friend. She was referred due to her platelets being low recently. She reports that her energy is poor, feels fatigued all the time. Able to work outside her home, but struggles completing tasks at home. Occasionally naps during the day. At night falls asleep easily, but unable to stay asleep, wakes around 4-5 am. Appetite is poor, barely able to eat one meal daily, mainly drinks pepsi, consumes very little water. She is under an immense amount of stress. Denies any weight loss. Chronic constipation, states will have episodes of formed stool followed by loose watery stool. Denies any hematochezia, or melanic stool. No abdominal pain or cramping. No urinary issues reported. Night sweats once every for the past few months. Shortness of breath with activity, especially stairs, walking, struggles catching her breath, using rescue inhalers couple times monthly. Bruises easily. Multiple painful lesions/lumps in her axillary region. Denies night sweats, fever. No recurrent infections or lymphadenopathy. No bone pain. No known blood disorders in family. Has had surgery in past w/o issue.      Patient reports history of Iron deficiency anemia. Post menopausal, reports amenorrhea over the past year.       Interval history 8/29/24:   Patient is in office today with friend to discuss lab results. She reports no significant changes in her health since our last appointment. She remains fatigued all the time. Denies any abnormal bleeding or bruising. Decreased appetite, " reports eating one meal daily. ROSSI with exertion, continues to use rescue inhaler as needed.   Denies any B symptoms.     Colonoscopy 2 years ago- 2 polyps found in ascending colon and sigmoid colon   MMG is due  Pap was last completed 5 years     up to date on cancer screenings           Menstrual Cycle History:  About how long do your periods last: no longer having menstural cycles   How would do you describe your flow during your period:   How frequently do you have to change your menstrual pads during your period: last cycle approximately 1 year ago     Previous Hematological Background:  Hx of hematological disorders: Yes - Patient reports a hematologic history of iron deficiency   Hx of blood transfusions: No - Patient denies prior blood transfusion  Hx of iron supplementation: Yes - Oral supplementation - Denies adverse effect and reaction - Agent: ferrous sulfate   Hx of B12 supplementation: Yes - Prior oral supplementation - Denies adverse effect and reaction  Hx of folate supplementation: Yes - Prior oral supplementation - Denies adverse effect and reaction     Relevant medications:  Currently using NSAID's (Naproxen, Ibuprofen etc.): occasional   Currently taking any supplements: none           PMHx:       Active Ambulatory Problems     Diagnosis Date Noted    Bilateral carotid artery stenosis 03/15/2023    Borderline personality disorder (Multi) 03/15/2023    Chronic back pain 03/15/2023    Cigarette nicotine dependence 03/15/2023    Depression, major, single episode, moderate (Multi) 03/15/2023    Folate deficiency 03/15/2023    Generalized anxiety disorder with panic attacks 03/15/2023    Low esophagus 03/15/2023    GERD (gastroesophageal reflux disease) 03/15/2023    History of shingles 03/15/2023    Hypercholesterolemia 03/15/2023    Low iron 03/15/2023    Migraine 03/15/2023    Other intervertebral disc degeneration, lumbar region 03/15/2023    Overactive bladder 03/15/2023    Peptic ulcer  03/15/2023    Radiculopathy, lumbar region 03/15/2023    Right elbow tendinitis 03/15/2023    Hand eczema 03/15/2023    Vesicular foot eczema 03/15/2023    Vitamin B12 deficiency 03/15/2023    Urinary hesitancy 2023    Boils 2023    Vitamin D deficiency 2023    Constipation 2023    Colon cancer screening 2023    Chondromalacia patellae 2016    Chronic pain of left knee 2016    Depression 2023    Right ankle pain 2016    Sciatica 2023    Syncopal episodes 2023    Atopic neurodermatitis 2020    Bone lesion 2023    Right ankle sprain 2023    Migraine, unspecified, not intractable, without status migrainosus 2023    Closed displaced fracture of cuboid bone of right foot 10/11/2023    Enchondroma of phalanx of foot 2023    Hypertriglyceridemia 2024    Callus 2024    Seasonal allergies 2024    Low platelet count (CMS-HCC) 2024    Medication management 2024           Resolved Ambulatory Problems     Diagnosis Date Noted    No Resolved Ambulatory Problems           Past Medical History:   Diagnosis Date    Immunization not carried out because of patient refusal      Other conditions influencing health status      Other specified postprocedural states      Other specified postprocedural states      Personal history of other medical treatment      Personal history of other specified conditions 2020            PSHx:  Surgical History         Past Surgical History:   Procedure Laterality Date     SECTION, CLASSIC   2001    COLONOSCOPY   2023     TUBLAR ADENOMA-REPEAT IN 3 YEARS    ESOPHAGOGASTRODUODENOSCOPY   2021     A.ANTRAL BX: GASTRIC ANTRAL-TYPE MUCOSA WITH REACTIVE GASTROPATHY. NO HELICOBACTER PYLORI SEEN ON ROUTINE STAINED SLIDES.  B.  BX DISTAL ESOPHAGUS: GASTRIC AND SQUAMOUS ESOPHAGEAL MUCOSA WITH FOCAL INTESTINAL METAPLASIA; NEGATIVE FOR DYSPLASIA. C. BX ESOPHAGUS:  "SQUAMOUS ESOPHAG    KNEE SURGERY   2005     X 4 SURGIES FIRST 2 WERE SCOPES, 3RD PLACED PINS AND 4TH WAS REMOVAL OF PINS    LAMINECTOMY   04/21/2006    SPINAL FUSION   05/03/2007     LATOSHA WAS PLACED    TUBAL LIGATION   2001               FHx:  Family History          Family History   Problem Relation Name Age of Onset    Hypertension Mother        Hypertension Father        Colon cancer Father        Depression Brother        Colon cancer Maternal Grandmother        Hypertension Other GRANDPARENT      Lymphoma Other GRANDPARENT      Heart attack Other GRANDPARENT      Diabetes Other GRANDPARENT              Social Hx:  Africa Gastelum \"Daniel\"    reports that she has been smoking cigarettes. She has a 18.5 pack-year smoking history. She has never used smokeless tobacco.  She  reports that she does not currently use alcohol.  She  reports no history of drug use.  Social History   Social History            Socioeconomic History    Marital status:    Tobacco Use    Smoking status: Every Day       Current packs/day: 0.50       Average packs/day: 0.5 packs/day for 37.0 years (18.5 ttl pk-yrs)       Types: Cigarettes    Smokeless tobacco: Never   Vaping Use    Vaping status: Never Used   Substance and Sexual Activity    Alcohol use: Not Currently    Drug use: Never         Living Situation: with adult son and her friend Walker  Occupation: works at [employer]  Marital Status: - has 3 adult children  Alcohol Use: none  Smoking: ppd for 38 years   Recreational Drug Use: none  Special Diets: Regular     Medications and allergies reviewed in EMR.     ROS:  Review of Systems   Constitutional:  Positive for appetite change.   HENT:  Negative.     Eyes: Negative.    Respiratory:  Positive for shortness of breath.    Cardiovascular: Negative.    Gastrointestinal:  Positive for constipation.   Endocrine: Negative.    Genitourinary: Negative.     Musculoskeletal: Negative.    Skin: Negative.    Neurological: Negative. "    Hematological:  Bruises/bleeds easily.   Psychiatric/Behavioral: Negative.        10 point review of systems negative except as state in HPI.     Vitals & Statistics:       Objective    BSA: There is no height or weight on file to calculate BSA.  There were no vitals taken for this visit.     Physical Exam  Vitals and nursing note reviewed.   Constitutional:       Appearance: Normal appearance.   HENT:      Head: Normocephalic and atraumatic.      Nose: Nose normal.      Mouth/Throat:      Pharynx: Oropharynx is clear.   Eyes:      General: No scleral icterus.     Extraocular Movements: Extraocular movements intact.      Conjunctiva/sclera: Conjunctivae normal.   Cardiovascular:      Rate and Rhythm: Normal rate and regular rhythm.      Pulses: Normal pulses.      Heart sounds: Normal heart sounds.   Pulmonary:      Effort: Pulmonary effort is normal.      Breath sounds: Normal breath sounds.   Abdominal:      Palpations: Abdomen is soft.      Tenderness: There is no abdominal tenderness.   Musculoskeletal:         General: Normal range of motion.      Cervical back: Normal range of motion.   Skin:     General: Skin is warm and dry.   Neurological:      General: No focal deficit present.      Mental Status: She is alert and oriented to person, place, and time.   Psychiatric:         Mood and Affect: Mood normal.         Behavior: Behavior normal.         Thought Content: Thought content normal.         Judgment: Judgment normal.         Performance Status:  Symptomatic; fully ambulatory      Assessment/Plan     Thrombocytopenia     - Mild Thrombocytopenia since at least 05/02/23 with a platelet ranging from 141-154k.   - remainder of CBC is stable- Hg ranges from 14.9-16.6, WBC ranges from 6.1-7.6  -  CMP is stable  - Elevated Ferritin reported twice- 11/4/23 at 151, and 8/15/24 at 244  - Inflammatory Markers are stable  - No indication of infectious diseases  - Discussed possible etiologies of thrombocytopenia  including: vitamin deficiency, enlarged spleen/liver, autoimmune disease, infection, inflammatory disorder, allergies, collection tube clumping, etc.   -Will start hematological workup today.    Interval History 8/29/24  - Nothing malicious was reported in patients hematological work-up.   -Patient remains asymptomatic.   - Discussed checking a peripheral smear for cell abnormalities and CT C/A/P to check for hepatomegaly, pt is agreeable to proceed, will schedule accordingly  - Discussed signs and symptoms that warrant being seen sooner.      2. Elevated Ferritin  - two episodes of ferritin being elevated were noted- 11/4/23 at 151 and 8/15/24 at 244  - pt is currently taking oral iron daily, discussed reducing dosing to every other day  - Dicussed possible etiologies- including but not limited iron supplementation/diet, liver disease, falsely elevated d/t inflammation/infection  - At this time will continue to monitor labs     3. Thyroid Nodules:  - per CT Scan in 2021  few scattered lung nodules were noted- 5mm right upper lobe, and 4 mm in left upper lobe, a dense calcified subpleural left apical 3mm nodule was noted. Along with multinodular thyroid with large nodule inferior isthmus,, sternal notch region, measuring about 3mm-   - Will order repeat imaging and US of thyroid    Plan:  Schedule CT C/A/P Scan to rule our hepatomegaly, and surveillance of lung nodules  US of Thyroid  Repeat CBC w/diff, and include a peripheral smear  RTC after imaging      I had an extensive discussion with the patient regarding the diagnosis and discussed the plan of therapy, including general considerations regarding side effects and outcomes. Pt understood and gave appropriate teach back about the plan of care. All questions were answered to the patient's satisfaction. The patient is instructed to contact us at any time if questions or problems arise. Thank you for the opportunity to participate in the care of this very pleasant  patient.     Total time = 30 minutes. 50% or more of this time was spent in counseling and/or coordination of care including reviewing medical history/radiology/labs, examining patient, formulating outlined plan with team, and discussing plan with patient/family.            Yessica Mayo, APRN-CNP

## 2024-08-29 ENCOUNTER — OFFICE VISIT (OUTPATIENT)
Dept: HEMATOLOGY/ONCOLOGY | Facility: CLINIC | Age: 49
End: 2024-08-29
Payer: COMMERCIAL

## 2024-08-29 VITALS
OXYGEN SATURATION: 96 % | RESPIRATION RATE: 20 BRPM | HEART RATE: 77 BPM | BODY MASS INDEX: 26.6 KG/M2 | SYSTOLIC BLOOD PRESSURE: 117 MMHG | WEIGHT: 160.4 LBS | DIASTOLIC BLOOD PRESSURE: 75 MMHG

## 2024-08-29 DIAGNOSIS — D69.6 LOW PLATELET COUNT (CMS-HCC): ICD-10-CM

## 2024-08-29 LAB
BASOPHILS # BLD AUTO: 0.03 X10*3/UL (ref 0–0.1)
BASOPHILS NFR BLD AUTO: 0.4 %
EOSINOPHIL # BLD AUTO: 0.06 X10*3/UL (ref 0–0.7)
EOSINOPHIL NFR BLD AUTO: 0.9 %
ERYTHROCYTE [DISTWIDTH] IN BLOOD BY AUTOMATED COUNT: 13.9 % (ref 11.5–14.5)
HCT VFR BLD AUTO: 43.4 % (ref 36–46)
HGB BLD-MCNC: 14.4 G/DL (ref 12–16)
IMM GRANULOCYTES # BLD AUTO: 0.02 X10*3/UL (ref 0–0.7)
IMM GRANULOCYTES NFR BLD AUTO: 0.3 % (ref 0–0.9)
LYMPHOCYTES # BLD AUTO: 2.17 X10*3/UL (ref 1.2–4.8)
LYMPHOCYTES NFR BLD AUTO: 32.1 %
MCH RBC QN AUTO: 30 PG (ref 26–34)
MCHC RBC AUTO-ENTMCNC: 33.2 G/DL (ref 32–36)
MCV RBC AUTO: 90 FL (ref 80–100)
MONOCYTES # BLD AUTO: 0.34 X10*3/UL (ref 0.1–1)
MONOCYTES NFR BLD AUTO: 5 %
NEUTROPHILS # BLD AUTO: 4.13 X10*3/UL (ref 1.2–7.7)
NEUTROPHILS NFR BLD AUTO: 61.3 %
NRBC BLD-RTO: 0 /100 WBCS (ref 0–0)
PLATELET # BLD AUTO: 129 X10*3/UL (ref 150–450)
RBC # BLD AUTO: 4.8 X10*6/UL (ref 4–5.2)
WBC # BLD AUTO: 6.8 X10*3/UL (ref 4.4–11.3)

## 2024-08-29 PROCEDURE — 36415 COLL VENOUS BLD VENIPUNCTURE: CPT

## 2024-08-29 PROCEDURE — 99214 OFFICE O/P EST MOD 30 MIN: CPT

## 2024-08-29 PROCEDURE — 85025 COMPLETE CBC W/AUTO DIFF WBC: CPT

## 2024-08-29 ASSESSMENT — PAIN SCALES - GENERAL: PAINLEVEL: 0-NO PAIN

## 2024-08-29 NOTE — PATIENT INSTRUCTIONS
Pt Ferritin remains elevated at 244, Platelets remain low, but stable at 141k  Remainder of CBC and CMP are stable  Decrease iron intake to 3 times weekly  Will order CT C/A/P-  to rule out fatty liver and evaluate previous lung nodule seen on CT in 2021  Will order Thyroid US, to evaluate nodule seen on CT in 2021  Will repeat CBC w/diff, and peripheral smear  RTC after imaging

## 2024-08-29 NOTE — PROGRESS NOTES
CBC drawn at visit  Pt set up for CT scans and US of thyroid on 9/13 1245 arriva to hosp- instructed on prep for Cts- sent to PA  Rtc for results with CNP on 9/17 230  Reviewed AVS with patient- patient verbalizes understanding

## 2024-08-30 LAB
BASOPHILS # BLD MANUAL: 0 X10*3/UL (ref 0–0.1)
BASOPHILS NFR BLD MANUAL: 0 %
EOSINOPHIL # BLD MANUAL: 0.14 X10*3/UL (ref 0–0.7)
EOSINOPHIL NFR BLD MANUAL: 2 %
LYMPHOCYTES # BLD MANUAL: 2.04 X10*3/UL (ref 1.2–4.8)
LYMPHOCYTES NFR BLD MANUAL: 30 %
MONOCYTES # BLD MANUAL: 0.61 X10*3/UL (ref 0.1–1)
MONOCYTES NFR BLD MANUAL: 9 %
NEUTROPHILS # BLD MANUAL: 3.81 X10*3/UL (ref 1.2–7.7)
NEUTS BAND # BLD MANUAL: 0.14 X10*3/UL (ref 0–0.7)
NEUTS BAND NFR BLD MANUAL: 2 %
NEUTS SEG # BLD MANUAL: 3.67 X10*3/UL (ref 1.2–7)
NEUTS SEG NFR BLD MANUAL: 54 %
RBC MORPH BLD: NORMAL
TOTAL CELLS COUNTED BLD: 7
VARIANT LYMPHS # BLD MANUAL: 0.2 X10*3/UL (ref 0–0.5)
VARIANT LYMPHS NFR BLD: 3 %

## 2024-09-13 ENCOUNTER — HOSPITAL ENCOUNTER (OUTPATIENT)
Dept: RADIOLOGY | Facility: HOSPITAL | Age: 49
Discharge: HOME | End: 2024-09-13
Payer: COMMERCIAL

## 2024-09-13 DIAGNOSIS — D69.6 LOW PLATELET COUNT (CMS-HCC): ICD-10-CM

## 2024-09-13 PROCEDURE — 74177 CT ABD & PELVIS W/CONTRAST: CPT

## 2024-09-13 PROCEDURE — 76536 US EXAM OF HEAD AND NECK: CPT | Performed by: RADIOLOGY

## 2024-09-13 PROCEDURE — 76536 US EXAM OF HEAD AND NECK: CPT

## 2024-09-13 PROCEDURE — 2550000001 HC RX 255 CONTRASTS

## 2024-09-14 DIAGNOSIS — E55.9 VITAMIN D DEFICIENCY: ICD-10-CM

## 2024-09-14 DIAGNOSIS — F32.1 MAJOR DEPRESSIVE DISORDER, SINGLE EPISODE, MODERATE (MULTI): ICD-10-CM

## 2024-09-14 DIAGNOSIS — Z87.898 PERSONAL HISTORY OF OTHER SPECIFIED CONDITIONS: ICD-10-CM

## 2024-09-17 ENCOUNTER — OFFICE VISIT (OUTPATIENT)
Dept: HEMATOLOGY/ONCOLOGY | Facility: CLINIC | Age: 49
End: 2024-09-17
Payer: COMMERCIAL

## 2024-09-17 VITALS
DIASTOLIC BLOOD PRESSURE: 78 MMHG | OXYGEN SATURATION: 97 % | TEMPERATURE: 96.4 F | BODY MASS INDEX: 27.24 KG/M2 | RESPIRATION RATE: 20 BRPM | SYSTOLIC BLOOD PRESSURE: 115 MMHG | HEART RATE: 88 BPM | WEIGHT: 164.3 LBS

## 2024-09-17 DIAGNOSIS — D69.6 LOW PLATELET COUNT (CMS-HCC): ICD-10-CM

## 2024-09-17 PROCEDURE — 36415 COLL VENOUS BLD VENIPUNCTURE: CPT

## 2024-09-17 PROCEDURE — 99214 OFFICE O/P EST MOD 30 MIN: CPT

## 2024-09-17 PROCEDURE — 86038 ANTINUCLEAR ANTIBODIES: CPT | Mod: SAMLAB

## 2024-09-17 RX ORDER — ERGOCALCIFEROL 1.25 MG/1
50000 CAPSULE ORAL
Qty: 13 CAPSULE | Refills: 3 | Status: SHIPPED | OUTPATIENT
Start: 2024-09-22

## 2024-09-17 RX ORDER — MECLIZINE HYDROCHLORIDE 25 MG/1
TABLET ORAL
Qty: 90 TABLET | Refills: 1 | Status: SHIPPED | OUTPATIENT
Start: 2024-09-17

## 2024-09-17 RX ORDER — ARIPIPRAZOLE 10 MG/1
TABLET ORAL
Qty: 90 TABLET | Refills: 1 | Status: SHIPPED | OUTPATIENT
Start: 2024-09-17

## 2024-09-17 ASSESSMENT — PAIN SCALES - GENERAL: PAINLEVEL: 0-NO PAIN

## 2024-09-17 NOTE — PROGRESS NOTES
"Patient ID: Africa Gastelum \"Daniel\" is a 48 y.o. female.    Subjective    HPI    HPI  Patient ID: Africa Gastelum \"Mckayla" is a 48 y.o. female.  Referring Physician: RUFINO Santana  [address]  [place] [address]  Primary Care Provider: RUFINO Santana  Visit Type: Initial Visit     Diagnosis/Reason: Thrombocytopenia     HPI:  Africa Gastelum is a 48 y.o. female referred for consultation of thrombocytopenia.     Patient is office today with her friend. She was referred due to her platelets being low recently. She reports that her energy is poor, feels fatigued all the time. Able to work outside her home, but struggles completing tasks at home. Occasionally naps during the day. At night falls asleep easily, but unable to stay asleep, wakes around 4-5 am. Appetite is poor, barely able to eat one meal daily, mainly drinks pepsi, consumes very little water. She is under an immense amount of stress. Denies any weight loss. Chronic constipation, states will have episodes of formed stool followed by loose watery stool. Denies any hematochezia, or melanic stool. No abdominal pain or cramping. No urinary issues reported. Night sweats once every for the past few months. Shortness of breath with activity, especially stairs, walking, struggles catching her breath, using rescue inhalers couple times monthly. Bruises easily. Multiple painful lesions/lumps in her axillary region. Denies night sweats, fever. No recurrent infections or lymphadenopathy. No bone pain. No known blood disorders in family. Has had surgery in past w/o issue.      Patient reports history of Iron deficiency anemia. Post menopausal, reports amenorrhea over the past year.       Interval history 8/29/24:   Patient is in office today with friend to discuss lab results. She reports no significant changes in her health since our last appointment. She remains fatigued all the time. Denies any abnormal bleeding or bruising. Decreased " appetite, reports eating one meal daily. ROSSI with exertion, continues to use rescue inhaler as needed.   Denies any B symptoms.     9/17/24: Patient is in office today to discuss imaging. Patient remains having poor energy, admits that she does not get consistent sleep, she is being woke up hourly to every couple of hours. She drinks large amounts of beverages with caffeine, appetite remains poor. Bruises easily, denies any further abnormal bleeding. Chronic constipation. Overall reports no significant changes since our last follow-up.     Colonoscopy 2 years ago- 2 polyps found in ascending colon and sigmoid colon   MMG is due  Pap was last completed 5 years     up to date on cancer screenings     Menstrual Cycle History:  About how long do your periods last: no longer having menstural cycles   How would do you describe your flow during your period:   How frequently do you have to change your menstrual pads during your period: last cycle approximately 1 year ago     Previous Hematological Background:  Hx of hematological disorders: Yes - Patient reports a hematologic history of iron deficiency   Hx of blood transfusions: No - Patient denies prior blood transfusion  Hx of iron supplementation: Yes - Oral supplementation - Denies adverse effect and reaction - Agent: ferrous sulfate   Hx of B12 supplementation: Yes - Prior oral supplementation - Denies adverse effect and reaction  Hx of folate supplementation: Yes - Prior oral supplementation - Denies adverse effect and reaction     Relevant medications:  Currently using NSAID's (Naproxen, Ibuprofen etc.): occasional   Currently taking any supplements: none           PMHx:          Active Ambulatory Problems     Diagnosis Date Noted    Bilateral carotid artery stenosis 03/15/2023    Borderline personality disorder (Multi) 03/15/2023    Chronic back pain 03/15/2023    Cigarette nicotine dependence 03/15/2023    Depression, major, single episode, moderate (Multi)  03/15/2023    Folate deficiency 03/15/2023    Generalized anxiety disorder with panic attacks 03/15/2023    Low esophagus 03/15/2023    GERD (gastroesophageal reflux disease) 03/15/2023    History of shingles 03/15/2023    Hypercholesterolemia 03/15/2023    Low iron 03/15/2023    Migraine 03/15/2023    Other intervertebral disc degeneration, lumbar region 03/15/2023    Overactive bladder 03/15/2023    Peptic ulcer 03/15/2023    Radiculopathy, lumbar region 03/15/2023    Right elbow tendinitis 03/15/2023    Hand eczema 03/15/2023    Vesicular foot eczema 03/15/2023    Vitamin B12 deficiency 03/15/2023    Urinary hesitancy 03/16/2023    Boils 03/16/2023    Vitamin D deficiency 05/04/2023    Constipation 05/04/2023    Colon cancer screening 05/04/2023    Chondromalacia patellae 03/24/2016    Chronic pain of left knee 11/11/2016    Depression 07/11/2023    Right ankle pain 11/11/2016    Sciatica 07/11/2023    Syncopal episodes 07/11/2023    Atopic neurodermatitis 03/23/2020    Bone lesion 09/27/2023    Right ankle sprain 09/27/2023    Migraine, unspecified, not intractable, without status migrainosus 09/28/2023    Closed displaced fracture of cuboid bone of right foot 10/11/2023    Enchondroma of phalanx of foot 11/07/2023    Hypertriglyceridemia 05/28/2024    Callus 05/28/2024    Seasonal allergies 05/28/2024    Low platelet count ([facility]) 05/28/2024    Medication management 05/28/2024              Resolved Ambulatory Problems     Diagnosis Date Noted    No Resolved Ambulatory Problems              Past Medical History:   Diagnosis Date    Immunization not carried out because of patient refusal      Other conditions influencing health status      Other specified postprocedural states      Other specified postprocedural states      Personal history of other medical treatment      Personal history of other specified conditions 08/26/2020            PSHx:  Surgical History             Past Surgical History:  "  Procedure Laterality Date     SECTION, CLASSIC   2001    COLONOSCOPY   2023     TUBLAR ADENOMA-REPEAT IN 3 YEARS    ESOPHAGOGASTRODUODENOSCOPY   2021     A.ANTRAL BX: GASTRIC ANTRAL-TYPE MUCOSA WITH REACTIVE GASTROPATHY. NO HELICOBACTER PYLORI SEEN ON ROUTINE STAINED SLIDES.  B.  BX DISTAL ESOPHAGUS: GASTRIC AND SQUAMOUS ESOPHAGEAL MUCOSA WITH FOCAL INTESTINAL METAPLASIA; NEGATIVE FOR DYSPLASIA. C. BX ESOPHAGUS: SQUAMOUS ESOPHAG    KNEE SURGERY   2005     X 4 SURGIES FIRST 2 WERE SCOPES, 3RD PLACED PINS AND 4TH WAS REMOVAL OF PINS    LAMINECTOMY   2006    SPINAL FUSION   2007     LATOSHA WAS PLACED    TUBAL LIGATION                  FHx:  Family History               Family History   Problem Relation Name Age of Onset    Hypertension Mother        Hypertension Father        Colon cancer Father        Depression Brother        Colon cancer Maternal Grandmother        Hypertension Other GRANDPARENT      Lymphoma Other GRANDPARENT      Heart attack Other GRANDPARENT      Diabetes Other GRANDPARENT              Social Hx:  Africa AGUAYO Nirav \"Daniel\"    reports that she has been smoking cigarettes. She has a 18.5 pack-year smoking history. She has never used smokeless tobacco.  She  reports that she does not currently use alcohol.  She  reports no history of drug use.  Social History   Social History                Socioeconomic History    Marital status:    Tobacco Use    Smoking status: Every Day       Current packs/day: 0.50       Average packs/day: 0.5 packs/day for 37.0 years (18.5 ttl pk-yrs)       Types: Cigarettes    Smokeless tobacco: Never   Vaping Use    Vaping status: Never Used   Substance and Sexual Activity    Alcohol use: Not Currently    Drug use: Never         Living Situation: with adult son and her friend Walker  Occupation: works at [employer]  Marital Status: - has 3 adult children  Alcohol Use: none  Smoking: ppd for 38 years   Recreational Drug Use: " none  Special Diets: Regular     Medications and allergies reviewed in EMR.     Objective    BSA: There is no height or weight on file to calculate BSA.  There were no vitals taken for this visit.     Physical Exam  Vitals and nursing note reviewed.   Constitutional:       Appearance: Normal appearance.   HENT:      Head: Normocephalic and atraumatic.      Nose: Nose normal.      Mouth/Throat:      Mouth: Mucous membranes are moist.      Pharynx: Oropharynx is clear.   Eyes:      General: No scleral icterus.     Extraocular Movements: Extraocular movements intact.      Conjunctiva/sclera: Conjunctivae normal.   Cardiovascular:      Rate and Rhythm: Normal rate and regular rhythm.      Pulses: Normal pulses.      Heart sounds: Normal heart sounds.   Pulmonary:      Effort: Pulmonary effort is normal.      Breath sounds: Normal breath sounds.   Abdominal:      Palpations: Abdomen is soft.      Tenderness: There is no abdominal tenderness.   Musculoskeletal:         General: Normal range of motion.      Cervical back: Normal range of motion.   Skin:     General: Skin is warm and dry.   Neurological:      General: No focal deficit present.      Mental Status: She is alert and oriented to person, place, and time.   Psychiatric:         Mood and Affect: Mood normal.         Behavior: Behavior normal.         Thought Content: Thought content normal.         Judgment: Judgment normal.         Performance Status:  Symptomatic; fully ambulatory      Assessment/Plan      Thrombocytopenia      - Mild Thrombocytopenia since at least 05/02/23 with a platelet ranging from 141-154k.   - remainder of CBC is stable- Hg ranges from 14.9-16.6, WBC ranges from 6.1-7.6  -  CMP is stable  - Elevated Ferritin reported twice- 11/4/23 at 151, and 8/15/24 at 244  - Inflammatory Markers are stable  - No indication of infectious diseases  - Discussed possible etiologies of thrombocytopenia including: vitamin deficiency, enlarged spleen/liver,  autoimmune disease, infection, inflammatory disorder, allergies, collection tube clumping, etc.   -Will start hematological workup today.     Interval History 8/29/24  - Nothing malicious was reported in patients hematological work-up.   -Patient remains asymptomatic.   - Discussed checking a peripheral smear for cell abnormalities and CT C/A/P to check for hepatomegaly, pt is agreeable to proceed, will schedule accordingly  - Discussed signs and symptoms that warrant being seen sooner.    9/17/24  Reviewed 9/13/24 imaging with patient  - Thyroid US reported left lobe with a heterogeneous isoechoic solid nodules in the mid to lower pole measuring 2.3x1.5x1.6cm, smoothly marginated. TI-RADS 3, Tiny spongiform nodule measuring 6mm TI-RADS 1. No cervical LAD- follow up with repeat US in 1 year  - CT C/A/P  - Moderate centrilobular emphysematous changes, worse in upper lobes  -diffuse upper lobe centrilobular micro nodules, likely respiratory bronchiolitis, bibasilar subpleural ground-glass opacities.  -5mm stable pleural based nodule is seen in right upper lobe. No new or enlarging lung nodules     - Masslike thickening of the cervix is seen, bilateral cysts. Stable mild pelvis prolapse, recommended follow-up with Gynecology   - Discussed consideration for establishing with pulmonology with regards to emphysematous changes, pt deferred at this time    -Inflammatory markers were slightly elevated, discussed ordering additional lab work to check for autoimmune diseases, patient is agreeable. Will draw labs today and call with results.      2. Elevated Ferritin  - two episodes of ferritin being elevated were noted- 11/4/23 at 151 and 8/15/24 at 244  - pt is currently taking oral iron daily, discussed reducing dosing to every other day  - Dicussed possible etiologies- including but not limited iron supplementation/diet, liver disease, falsely elevated d/t inflammation/infection  - At this time will continue to monitor labs       3. Thyroid Nodules:  - per CT Scan in 2021  few scattered lung nodules were noted- 5mm right upper lobe, and 4 mm in left upper lobe, a dense calcified subpleural left apical 3mm nodule was noted. Along with multinodular thyroid with large nodule inferior isthmus,, sternal notch region, measuring about 3mm-   - Will order repeat imaging and US of thyroid     Plan:  Autoimmune labs today  Scheduled pt with Dr. Thomas for further evaluation of masslike thickening of her cervix  Will repeat Thyroid US in 1 year  Follow-up in 6 months with labs prior    (CBC w/diff, CMP, Ferritin, Iron Panel, B12)     I had an extensive discussion with the patient regarding the diagnosis and discussed the plan of therapy, including general considerations regarding side effects and outcomes. Pt understood and gave appropriate teach back about the plan of care. All questions were answered to the patient's satisfaction. The patient is instructed to contact us at any time if questions or problems arise. Thank you for the opportunity to participate in the care of this very pleasant patient.     Total time = 30 minutes. 50% or more of this time was spent in counseling and/or coordination of care including reviewing medical history/radiology/labs, examining patient, formulating outlined plan with team, and discussing plan with patient/family.            Yessica Mayo, APRN-CNP

## 2024-09-17 NOTE — PROGRESS NOTES
Pt setup to see Dr Rainey - tomorrow at 215  Next labs at the hosp 1/13/25  Rtc for CNP visit 1/16/25 at 230pm  Reviewed AVS with patient- patient verbalizes understanding

## 2024-09-17 NOTE — PATIENT INSTRUCTIONS
Autoimmune labs today  Scheduled pt with Dr. Thomas for further evaluation of masslike thickening of her cervix  Will repeat Thyroid US in 1 year  Follow-up in 6 months with labs prior    (CBC w/diff, CMP, Ferritin, Iron Panel, B12)

## 2024-09-18 ENCOUNTER — OFFICE VISIT (OUTPATIENT)
Dept: OBSTETRICS AND GYNECOLOGY | Facility: CLINIC | Age: 49
End: 2024-09-18
Payer: COMMERCIAL

## 2024-09-18 VITALS
BODY MASS INDEX: 27.26 KG/M2 | SYSTOLIC BLOOD PRESSURE: 110 MMHG | HEIGHT: 65 IN | WEIGHT: 163.6 LBS | DIASTOLIC BLOOD PRESSURE: 70 MMHG

## 2024-09-18 DIAGNOSIS — R93.5 ABNORMAL CT SCAN, PELVIS: Primary | ICD-10-CM

## 2024-09-18 DIAGNOSIS — Z12.4 ENCOUNTER FOR SCREENING FOR CERVICAL CANCER: ICD-10-CM

## 2024-09-18 LAB — ANA SER QL HEP2 SUBST: NEGATIVE

## 2024-09-18 PROCEDURE — 88141 CYTOPATH C/V INTERPRET: CPT | Performed by: PATHOLOGY

## 2024-09-18 PROCEDURE — 99459 PELVIC EXAMINATION: CPT | Performed by: OBSTETRICS & GYNECOLOGY

## 2024-09-18 PROCEDURE — 88175 CYTOPATH C/V AUTO FLUID REDO: CPT

## 2024-09-18 PROCEDURE — 3008F BODY MASS INDEX DOCD: CPT | Performed by: OBSTETRICS & GYNECOLOGY

## 2024-09-18 PROCEDURE — 99204 OFFICE O/P NEW MOD 45 MIN: CPT | Performed by: OBSTETRICS & GYNECOLOGY

## 2024-09-18 ASSESSMENT — PAIN SCALES - GENERAL: PAINLEVEL: 0-NO PAIN

## 2024-09-18 ASSESSMENT — PATIENT HEALTH QUESTIONNAIRE - PHQ9
1. LITTLE INTEREST OR PLEASURE IN DOING THINGS: NOT AT ALL
2. FEELING DOWN, DEPRESSED OR HOPELESS: NOT AT ALL
SUM OF ALL RESPONSES TO PHQ9 QUESTIONS 1 AND 2: 0

## 2024-09-18 NOTE — PROGRESS NOTES
Africa Gastelum is a 48 y.o. year old female patient.  PCP = Divina Zepeda, APRN-CNP    Chief Complaint   Patient presents with    New Patient Visit     New patient referred here by hematology due to mass on cervix. LMP: approx a year ago.        HPI   Presents at the request of her hematologist regarding abnormal finding on recent CT scan.  CT scan was obtained due to patient having thrombocytopenia.  Patient has been menopausal for the last year.  Denies any vaginal bleeding.  Denies any pelvic pain.  On , CT scan shows mass like thickening of the cervix.  Patient has not had a Pap smear for several years.  Denies any bowel or bladder problems.    OB History          3    Para   3    Term   3       0    AB   0    Living   3         SAB   0    IAB   0    Ectopic   0    Multiple   0    Live Births   3                 Past Medical History:   Diagnosis Date    Immunization not carried out because of patient refusal     Influenza vaccination declined    Other conditions influencing health status     Paronychia    Other specified postprocedural states     History of Papanicolaou smear    Other specified postprocedural states     History of Papanicolaou smear    Personal history of other medical treatment     H/O mammogram    Personal history of other specified conditions 2020    History of syncope       Past Surgical History:   Procedure Laterality Date     SECTION, CLASSIC      COLONOSCOPY  2023    TUBLAR ADENOMA-REPEAT IN 3 YEARS    ESOPHAGOGASTRODUODENOSCOPY  2021    A.ANTRAL BX: GASTRIC ANTRAL-TYPE MUCOSA WITH REACTIVE GASTROPATHY. NO HELICOBACTER PYLORI SEEN ON ROUTINE STAINED SLIDES.  B.  BX DISTAL ESOPHAGUS: GASTRIC AND SQUAMOUS ESOPHAGEAL MUCOSA WITH FOCAL INTESTINAL METAPLASIA; NEGATIVE FOR DYSPLASIA. C. BX ESOPHAGUS: SQUAMOUS ESOPHAG    KNEE SURGERY  2005    X 4 SURGIES FIRST 2 WERE SCOPES, 3RD PLACED PINS AND 4TH WAS REMOVAL OF PINS    LAMINECTOMY   04/21/2006    SPINAL FUSION  05/03/2007    LATOSHA WAS PLACED    TUBAL LIGATION  2001       Review of Systems:   Constitutional: No fever or chills  Respiratory: No shortness of breath, or cough  Cardiovascular: No chest pain or syncope  Breasts: No breast pain, no masses, no nipple discharge  Gastrointestinal: No nausea, vomiting, or diarrhea, no abdominal pain  Genitourinary: No dysuria or frequency  Gynecology: Negative except as noted in history of present illness  All other: All other systems reviewed and negative for complaint    Medication Documentation Review Audit       Reviewed by David Thomas MD (Physician) on 09/18/24 at 1432      Medication Order Taking? Sig Documenting Provider Last Dose Status   albuterol 90 mcg/actuation inhaler [id number] No INHALE 2 PUFFS EVERY 6 HOURS AS NEEDED FOR SHORTNESS OF BREATH OR FOR WHEEZE RUFINO Santana Taking Active   amitriptyline (Elavil) 50 mg tablet [id number] No Take 1 tablet (50 mg) by mouth once daily at bedtime. RUFINO Santana Taking Active   Discontinued 09/17/24 0841   ARIPiprazole (Abilify) 10 mg tablet [id number]  TAKE 1 TABLET BY MOUTH EVERY DAY RUFINO Santana  Active   atorvastatin (Lipitor) 40 mg tablet [id number] No Take 1 tablet (40 mg) by mouth once daily. RUFINO Santana Taking Active   buPROPion (Zyban) 150 mg 12 hr tablet [id number] No Take 1 tablet (150 mg) by mouth every 12 hours. Do not crush, chew, or split. RUFINO Santana Taking Active   cyanocobalamin (Vitamin B-12) 1,000 mcg tablet [id number] No TAKE 1 TABLET (1,000 MCG) BY MOUTH EVERY OTHER DAY. RUFINO Santana Taking Active   Discontinued 09/17/24 0841   ergocalciferol (Vitamin D-2) 1.25 MG (72699 UT) capsule [id number]  TAKE 1 CAPSULE (01886 UNITS) BY MOUTH ONE TIME PER WEEK RUFINO Santana  Active   fenofibrate (Tricor) 48 mg tablet [id number] No Take 1 tablet (48 mg) by mouth once daily. RUFINO Santana Taking Active   ferrous sulfate,  "325 mg ferrous sulfate, tablet [id number] No TAKE 1 TABLET BY MOUTH ONCE DAILY. RUFINO Santana Taking Active   folic acid (Folvite) 1 mg tablet [id number] No TAKE 1 TABLET BY MOUTH ONCE DAILY. RUFINO Santana Taking Active   gabapentin (Neurontin) 600 mg tablet [id number] No Take 1 tablet (600 mg) by mouth 5 times a day. Historical Provider, MD Taking Active   loratadine (Claritin) 10 mg tablet [id number] No Take 1 tablet (10 mg) by mouth once daily. RUFINO Santana Taking Active   Discontinued 09/17/24 0841   meclizine (Antivert) 25 mg tablet [id number]  TAKE 1 TABLET BY MOUTH THREE TIMES A DAY AS NEEDED RUFINO Santana  Active   mirabegron (Myrbetriq) 25 mg tablet extended release 24 hr 24 hr tablet [id number] No Take 1 tablet (25 mg) by mouth once daily. RUFINO Santana Taking Active   omeprazole (PriLOSEC) 40 mg DR capsule [id number] No TAKE 1 CAPSULE BY MOUTH EVERY DAY RUFINO Santana Taking Active   Oyster Shell Calcium-Vit D3 500 mg-5 mcg (200 unit) tablet [id number] No TAKE 1 TABLET BY MOUTH TWICE A DAY RUFINO Santana Taking Active   SUMAtriptan (Imitrex) 50 mg tablet [id number] No TAKE 1 TABLET FOR MIGRAINE RELIEF. MAY REPEAT EVERY 2 HOURS. MAX 200MG/DAY. RUFINO Santana Taking Active   tiZANidine (Zanaflex) 4 mg tablet [id number] No Take 1 tablet (4 mg) by mouth 3 times a day. Historical Provider, MD Taking Active   venlafaxine XR (Effexor-XR) 150 mg 24 hr capsule [id number] No TAKE 1 CAPSULE BY MOUTH EVERY DAY WITH FOOD RUFINO Santana Taking Active                     /70   Ht 1.654 m (5' 5.12\")   Wt 74.2 kg (163 lb 9.6 oz)   BMI 27.12 kg/m²     PHYSICAL EXAMINATION:  Chaperone present for exam: Aidee Noriega LPN  Well-developed, well nourished, in no acute distress, alert and oriented x three, is pleasant and cooperative.  HEENT: Clear. Pupils equal, round and reactive to light and accommodation. Extraocular muscles are intact. Oral " mucosa pink without exudate.   NECK: No lymphadenopathy, no thyromegaly.  LUNGS: Clear bilaterally.  HEART: Regular rate and rhythm without murmurs.  ABDOMEN: Normoactive bowel sounds, soft and nontender, no guarding or rebound tenderness, no CVA tenderness.  EXTREMITIES: No clubbing, cyanosis or edema.  NEUROLOGIC:  Cranial nerves II-XII grossly intact.  :  Normal external female genitalia, normal vulva, normal vagina. Normal urethral meatus, urethra and bladder. Normal appearing cervix. Normal-sized uterus, no adnexal masses or tenderness. Pap smear performed today.    Interpreted By:  Idris Bonner and Maltbie Grace   STUDY:  CT CHEST ABDOMEN PELVIS W IV CONTRAST;  9/13/2024 3:13 pm      INDICATION:  Signs/Symptoms:increased SOB.      ,D69.6 Thrombocytopenia, unspecified (CMS-HCC)      COMPARISON:  CTA chest abdomen pelvis 03/31/2021  Thyroid ultrasound 09/13/2024      ACCESSION NUMBER(S):  [id number]      ORDERING CLINICIAN:  SIMONE VIRK      TECHNIQUE:  CT of the chest, abdomen, and pelvis was performed.  Contiguous axial  images were obtained at 3 mm slice thickness through the chest,  abdomen and pelvis. Coronal and sagittal reconstructions at 3 mm  slice thickness were performed.  73 ml of contrast Omnipaque 350 were administered intravenously  without immediate complication.      FINDINGS:  CHEST:      LUNG/PLEURA/LARGE AIRWAYS:  No consolidation, pleural effusion or pneumothorax. Moderate  centrilobular emphysematous changes, worse in the upper lobes.  Peribronchial thickening is seen. Diffuse upper lobe predominant  centrilobular micronodules, likely respiratory bronchiolitis.  Bibasilar subpleural ground-glass opacities.      A stable 5 mm pleural-based nodule is seen in the right upper lobe on  series 4, image 23. No new or enlarging lung nodule.      VESSELS:  Aorta and pulmonary arteries are normal caliber.  No atherosclerotic  changes of the aorta are identified.  No coronary  artery  calcifications are present.      HEART:  The heart is normal in size.  No pericardial effusion      MEDIASTINUM AND JAMEEL:  No mediastinal, hilar or axillary lymphadenopathy is present.  The  esophagus is normal.      CHEST WALL AND LOWER NECK:  The soft tissues of the chest wall demonstrate no gross abnormality.  Multinodular goiter, to be correlated with thyroid ultrasound  09/13/2024, dictated separately.      ABDOMEN:      LIVER:  The liver is normal in size without evidence of focal liver lesions.      BILE DUCTS:  The intrahepatic and extrahepatic ducts are not dilated.      GALLBLADDER:  The gallbladder is surgically absent.      PANCREAS:  The pancreas appears unremarkable without evidence of ductal  dilatation or masses.      SPLEEN:  Within normal limits.      ADRENAL GLANDS:  Within normal limits.      KIDNEYS AND URETERS:  The kidneys are normal in size and enhance symmetrically.  No  hydroureteronephrosis or nephroureterolithiasis is identified.      PELVIS:      BLADDER:  Within normal limits.      REPRODUCTIVE ORGANS:  Masslike thickening of the cervix is seen, for example on series 3,  image 182 Bilateral adnexal cysts, likely physiologic. Stable mild  pelvic prolapse      BOWEL:  The stomach is unremarkable.  The small and large bowel are normal in  caliber and demonstrate no wall thickening.  The appendix is not  definitely visualized. There is however no pericecal stranding or  fluid.          VESSELS:  The aorta and IVC appear normal.      PERITONEUM/RETROPERITONEUM/LYMPH NODES:  There is no free or loculated fluid collection, no free  intraperitoneal air. The retroperitoneum appears normal.  No  abdominopelvic lymphadenopathy is present.      BONE AND SOFT TISSUE:  Postsurgical changes of lumbar posterior fusion.  No suspicious osseous lesions are identified.  Granuloma is seen in  the right buttocks subcutaneous soft tissues.      IMPRESSION:  1. Moderate centrilobular emphysematous  changes. Peribronchial  thickening with diffuse upper lobe predominant centrilobular  micronodules, likely respiratory bronchiolitis.  2. Masslike thickening of the cervix is seen, further evaluation with  pelvic ultrasound and physical examination is recommended.  3. Multinodular goiter, to be correlated with thyroid ultrasound  09/13/2024, dictated separately.      I personally reviewed the images/study and I agree with the findings  as stated by Abby De León MD. This study was interpreted at  [place], [place].      MACRO:  None      Signed by: Idris Bonner 9/14/2024 8:31 PM  Dictation workstation:   [id number]    Problem List Items Addressed This Visit    None  Visit Diagnoses       Abnormal CT scan, pelvis    -  Primary    Relevant Orders    US PELVIS TRANSABDOMINAL WITH TRANSVAGINAL    Encounter for screening for cervical cancer        Relevant Orders    THINPREP PAP TEST             Provider Impression:  1.  Abnormal CT scan of the pelvis, masslike thickening of the cervix  2.  Pap smear obtained today  Patient informed that the cervix appears normal on today's exam.  No evidence of any masses of the cervix on today's exam.  Will obtain a pelvic ultrasound for closer evaluation of the cervix.  Follow-up in 2 weeks to review results.

## 2024-09-19 ENCOUNTER — HOSPITAL ENCOUNTER (OUTPATIENT)
Dept: RADIOLOGY | Facility: HOSPITAL | Age: 49
Discharge: HOME | End: 2024-09-19
Payer: COMMERCIAL

## 2024-09-19 ENCOUNTER — TELEPHONE (OUTPATIENT)
Dept: HEMATOLOGY/ONCOLOGY | Facility: CLINIC | Age: 49
End: 2024-09-19
Payer: COMMERCIAL

## 2024-09-19 DIAGNOSIS — R93.5 ABNORMAL CT SCAN, PELVIS: ICD-10-CM

## 2024-09-19 PROCEDURE — 76856 US EXAM PELVIC COMPLETE: CPT

## 2024-10-02 DIAGNOSIS — A59.01 TRICHOMONAS VAGINITIS: Primary | ICD-10-CM

## 2024-10-02 LAB
CYTOLOGY CMNT CVX/VAG CYTO-IMP: NORMAL
LAB AP HPV GENOTYPE QUESTION: YES
LAB AP HPV HR: NORMAL
LABORATORY COMMENT REPORT: NORMAL
MENSTRUAL HX REPORTED: NORMAL
PATH REPORT.TOTAL CANCER: NORMAL

## 2024-10-02 RX ORDER — METRONIDAZOLE 500 MG/1
500 TABLET ORAL 2 TIMES DAILY
Qty: 14 TABLET | Refills: 0 | Status: SHIPPED | OUTPATIENT
Start: 2024-10-02 | End: 2024-10-09

## 2024-10-02 NOTE — RESULT ENCOUNTER NOTE
Normal pap.  Noted trichomonas vaginalis.  Prescription for Flagyl has been sent to the pharmacy.  Partner needs to be treated by his physician for the trichomonas.

## 2024-10-08 ENCOUNTER — APPOINTMENT (OUTPATIENT)
Dept: OBSTETRICS AND GYNECOLOGY | Facility: CLINIC | Age: 49
End: 2024-10-08
Payer: COMMERCIAL

## 2024-10-08 VITALS
BODY MASS INDEX: 26.99 KG/M2 | HEIGHT: 65 IN | SYSTOLIC BLOOD PRESSURE: 110 MMHG | DIASTOLIC BLOOD PRESSURE: 62 MMHG | WEIGHT: 162 LBS

## 2024-10-08 DIAGNOSIS — R93.5 ABNORMAL CT SCAN, PELVIS: Primary | ICD-10-CM

## 2024-10-08 PROCEDURE — 3008F BODY MASS INDEX DOCD: CPT | Performed by: OBSTETRICS & GYNECOLOGY

## 2024-10-08 PROCEDURE — 99213 OFFICE O/P EST LOW 20 MIN: CPT | Performed by: OBSTETRICS & GYNECOLOGY

## 2024-10-08 NOTE — PROGRESS NOTES
Africa Gastelum is a 48 y.o. year old female patient.  PCP = Divina Zepeda, APRN-CNP    Chief Complaint   Patient presents with    Results     Patient is here to review results.       HPI   Presents to review recent pelvic ultrasound.  Patient had a CT scan on  showing a mass like thickening of the cervix.  Patient states her last menstrual period was approximately a year ago.  Patient states she did finish the recent antibiotic for the trichomonas that was noticed on her recent Pap smear.    OB History          3    Para   3    Term   3       0    AB   0    Living   3         SAB   0    IAB   0    Ectopic   0    Multiple   0    Live Births   3                 Past Medical History:   Diagnosis Date    Immunization not carried out because of patient refusal     Influenza vaccination declined    Other conditions influencing health status     Paronychia    Other specified postprocedural states     History of Papanicolaou smear    Other specified postprocedural states     History of Papanicolaou smear    Personal history of other medical treatment     H/O mammogram    Personal history of other specified conditions 2020    History of syncope       Past Surgical History:   Procedure Laterality Date     SECTION, CLASSIC  2001    COLONOSCOPY  2023    TUBLAR ADENOMA-REPEAT IN 3 YEARS    ESOPHAGOGASTRODUODENOSCOPY  2021    A.ANTRAL BX: GASTRIC ANTRAL-TYPE MUCOSA WITH REACTIVE GASTROPATHY. NO HELICOBACTER PYLORI SEEN ON ROUTINE STAINED SLIDES.  B.  BX DISTAL ESOPHAGUS: GASTRIC AND SQUAMOUS ESOPHAGEAL MUCOSA WITH FOCAL INTESTINAL METAPLASIA; NEGATIVE FOR DYSPLASIA. C. BX ESOPHAGUS: SQUAMOUS ESOPHAG    KNEE SURGERY  2005    X 4 SURGIES FIRST 2 WERE SCOPES, 3RD PLACED PINS AND 4TH WAS REMOVAL OF PINS    LAMINECTOMY  2006    SPINAL FUSION  2007    LATOSHA WAS PLACED    TUBAL LIGATION         Review of Systems:   Constitutional: No fever or chills  Respiratory: No  shortness of breath, or cough  Cardiovascular: No chest pain or syncope  Breasts: No breast pain, no masses, no nipple discharge  Gastrointestinal: No nausea, vomiting, or diarrhea, no abdominal pain  Genitourinary: No dysuria or frequency  Gynecology: Negative except as noted in history of present illness  All other: All other systems reviewed and negative for complaint    Medication Documentation Review Audit       Reviewed by David Thomas MD (Physician) on 10/08/24 at 1526      Medication Order Taking? Sig Documenting Provider Last Dose Status   albuterol 90 mcg/actuation inhaler [id number] No INHALE 2 PUFFS EVERY 6 HOURS AS NEEDED FOR SHORTNESS OF BREATH OR FOR WHEEZE RUFINO Santana Taking Active   amitriptyline (Elavil) 50 mg tablet [id number] No Take 1 tablet (50 mg) by mouth once daily at bedtime. RUFINO Santana Taking Active   ARIPiprazole (Abilify) 10 mg tablet [id number]  TAKE 1 TABLET BY MOUTH EVERY DAY RUFINO Santana  Active   atorvastatin (Lipitor) 40 mg tablet [id number] No Take 1 tablet (40 mg) by mouth once daily. RUFINO Santana Taking Active   buPROPion (Zyban) 150 mg 12 hr tablet [id number] No Take 1 tablet (150 mg) by mouth every 12 hours. Do not crush, chew, or split. RUFINO Santana Taking Active   cyanocobalamin (Vitamin B-12) 1,000 mcg tablet [id number] No TAKE 1 TABLET (1,000 MCG) BY MOUTH EVERY OTHER DAY. RUFINO Santana Taking Active   ergocalciferol (Vitamin D-2) 1.25 MG (33381 UT) capsule [id number]  TAKE 1 CAPSULE (40485 UNITS) BY MOUTH ONE TIME PER WEEK RUFINO Santana  Active   fenofibrate (Tricor) 48 mg tablet [id number] No Take 1 tablet (48 mg) by mouth once daily. RUFINO Santana Taking Active   ferrous sulfate, 325 mg ferrous sulfate, tablet [id number] No TAKE 1 TABLET BY MOUTH ONCE DAILY. RUFINO Santana Taking Active   folic acid (Folvite) 1 mg tablet [id number] No TAKE 1 TABLET BY MOUTH ONCE DAILY. Divina Zepeda  "TL-CNP Taking Active   gabapentin (Neurontin) 600 mg tablet [id number] No Take 1 tablet (600 mg) by mouth 5 times a day. Historical Provider, MD Taking Active   loratadine (Claritin) 10 mg tablet [id number] No Take 1 tablet (10 mg) by mouth once daily. RUFINO Santana Taking Active   meclizine (Antivert) 25 mg tablet [id number]  TAKE 1 TABLET BY MOUTH THREE TIMES A DAY AS NEEDED RUFINO Santana  Active   metroNIDAZOLE (Flagyl) 500 mg tablet [id number]  Take 1 tablet (500 mg) by mouth 2 times a day for 7 days. David Thomas MD  Active   mirabegron (Myrbetriq) 25 mg tablet extended release 24 hr 24 hr tablet [id number] No Take 1 tablet (25 mg) by mouth once daily. RUFINO Santana Taking Active   omeprazole (PriLOSEC) 40 mg DR capsule [id number] No TAKE 1 CAPSULE BY MOUTH EVERY DAY RUFINO Santana Taking Active   Oyster Shell Calcium-Vit D3 500 mg-5 mcg (200 unit) tablet [id number] No TAKE 1 TABLET BY MOUTH TWICE A DAY RUFINO Santana Taking Active   SUMAtriptan (Imitrex) 50 mg tablet [id number] No TAKE 1 TABLET FOR MIGRAINE RELIEF. MAY REPEAT EVERY 2 HOURS. MAX 200MG/DAY. RUFINO Santana Taking Active   tiZANidine (Zanaflex) 4 mg tablet [id number] No Take 1 tablet (4 mg) by mouth 3 times a day. Historical Provider, MD Taking Active   venlafaxine XR (Effexor-XR) 150 mg 24 hr capsule [id number] No TAKE 1 CAPSULE BY MOUTH EVERY DAY WITH FOOD RUFINO Santana Taking Active                     /62   Ht 1.654 m (5' 5.12\")   Wt 73.5 kg (162 lb)   BMI 26.86 kg/m²     PHYSICAL EXAMINATION:  General: No acute distress  Eye: Intraocular movements are intact  HEENT: Normocephalic  Respiratory: Respirations are nonlabored  Gastrointestinal: Nondistended   Musculoskeletal: Normal range of motion  Neurologic: Alert and oriented x3  Psychiatric: Cooperative, appropriate mood and affect.    Interpreted By:  Merari Bar,   STUDY:  US PELVIS TRANSABDOMINAL WITH TRANSVAGINAL;  9/19/2024 " 6:12 pm      INDICATION:  Signs/Symptoms:Abnormal CT scan regarding the cervix.      ,R93.5 Abnormal findings on diagnostic imaging of other abdominal  regions, including retroperitoneum      COMPARISON:  None.      ACCESSION NUMBER(S):  [id number]      ORDERING CLINICIAN:  RENETTA PETERSON      TECHNIQUE:  Multiple multiplanar static gray scale, color and spectral waveform  sonographic images of the pelvis were obtained. Transabdominal and  endovaginal ultrasound was performed.      FINDINGS:  The study is limited due to large amount of bowel gas.      UTERUS:  The uterus measures  3.6 cm x 3.3 cm x 6.3 cm. The uterine myometrium  appears normal.      A hypoechoic areas noted in the cervix with blood which measures 1.4  x 1.4 x 1.1 cm.      ENDOMETRIUM:  The endometrium measures a thickness of 0.7 cm, which is thickened  for postmenopausal state.      RIGHT ADNEXA:  The right ovary measures 1.8 cm x 1.1 cm x 2.3 cm and demonstrates  normal flow. No gross right adnexal masses are seen, no hydrosalpinx.      LEFT ADNEXA:  The ovary was not visualized. No adnexal mass is seen.      CUL DE SAC:  No gross free fluid is seen in the pelvic cul-de-sac.      IMPRESSION:  1. 1.4 cm hypoechoic area in the cervix with blood flow. Further  evaluation is suggested.  2. Nonvisualization of the left ovary, without definite left adnexal  mass..  3. Thickened endometrium. Clinical correlation and further evaluation  is suggested, as clinically warranted.      MACRO:  Critical Finding:  See findings. Notification was initiated on  9/20/2024 at 7:40 pm by  Merari Bar.  (**-YCF-**) Instructions:      Signed by: Merari Bar 9/20/2024 7:40 PM  Dictation workstation:   [id number]    Problem List Items Addressed This Visit    None  Visit Diagnoses       Abnormal CT scan, pelvis    -  Primary    Relevant Orders    Referral to Gynecologic Oncology             Provider Impression:  1.  Abnormal CT scan of the pelvis  2.  Hypoechoic area in  the cervix with blood flow  3.  Thickened endometrium  Reviewed the recent pelvic ultrasound which reveals a 1.4 cm hypoechoic area in the cervix with blood flow.  The endometrial thickness is noted to be 7 mm.  Patient states that her last menstrual period was approximately a year ago.  It is uncertain as to the significance of the hypoechoic area in the cervix and therefore will refer to gynecologic oncologist for further evaluation.  Patient to return in 3 months and if she still has not had a menstrual flow would recommend endometrial biopsy regarding the slightly thickened endometrium.

## 2024-10-22 NOTE — PROGRESS NOTES
"Patient ID: Africa Gastelum \"Mckayla" is a 48 y.o. female.  Primary Care Provider: Divina Zepeda, APRN-CNP    Subjective    HPI:  Patient is a 48 year old female with thrombocytopenia referred to us for thickened endometrial lining and hypoechoic cervical lesion found on pelvic ultrasound.  Patient had a CT scan for thrombocytopenia which incidental finding of cervical mass was found, was then sent for pelvic ultrasound.  Patient has not had a menstrual period in 1 year.  She denies any vaginal discharge.  She had pap 24 which was negative, HPV-, +trichomonas.  She was treated for trich following pap.  She is not currently sexually active.  She denies any bowel or bladder issues.      Medical History:  Hyperlipidemia  Iron deficiency-PO supplement    Surgical History:  Laminectomy   Spinal fusion   Tubal ligation      Knee surgery     Social History:  Cigarette smoker -1 pack a day  Occ ETOH  Denies drug use    Obstetrics/Gynecology History:        Family History:  Father-colon cancer with mets to lung    Screening  Mammogram   Colonoscopy   Pap 2024 Negative, HPV-      Objective    Visit Vitals  /68 (BP Location: Right arm, Patient Position: Sitting, BP Cuff Size: Adult)   Pulse 81   Temp 36.7 °C (98.1 °F) (Temporal)   Resp 16        Interval history:  Patient is a 48 year old female with thickened endometrial lining on pelvic ultrasound and hypoechoic lesion on cervix.     CT A/P  24:  IMPRESSION:  1. Moderate centrilobular emphysematous changes. Peribronchial  thickening with diffuse upper lobe predominant centrilobular  micronodules, likely respiratory bronchiolitis.  2. Masslike thickening of the cervix is seen, further evaluation with  pelvic ultrasound and physical examination is recommended.  3. Multinodular goiter, to be correlated with thyroid ultrasound  2024, dictated separately.    Pelvic ultrasound 24;  IMPRESSION:  1. 1.4 cm hypoechoic " area in the cervix with blood flow. Further  evaluation is suggested.  2. Nonvisualization of the left ovary, without definite left adnexal  mass..  3. Thickened endometrium. Clinical correlation and further evaluation  is suggested, as clinically warranted.       Physical Exam:    Constitutional: Looks good  Eyes: PERRL  ENMT: Moist mucus membranes  Head/Neck: Supple. Symmetrical  Gastrointestinal: Non-distended, soft, non-tender  Genitourinary: EMB: Cervix was prepped with betadine. Anterior lip of the cervix was grasped with a single tooth tenaculum. Pipelle inserted. Unable to advance pipelle, no tissue obtained Pt tolerated the procedure well.   Colposcopy: Adequate colposcopy performed after application of dilute acetic acid solution to the cervix.  There were some acetowhite changes and punctate vessels noted at 12 o'clock.  1 cervical biopsies was taken from 12 o'clock and an ECC was also obtained.  Monsel's and pressure were used for hemostasis.  The patient tolerated the procedure well.   Musculoskeletal: ROM intact, no joint swelling, normal strength  Extremities: No edema  Neurological: Alert and oriented x 3. Pleasant and cooperative.  Lymphatic: No lymphadenopathy. No lymphedema  Psychological: Appropriate mood and behavior  Skin: Warm and dry, no lesions, no rashes    A complete review of systems was performed and all systems were normal except what is noted in the interval history.      Assessment/Plan  Patient is a 48 year old female with thickened endometrial lining on pelvic ultrasound and hypoechoic lesion on cervix.       PLAN:  Mammogram order  ECC and Cervical biopsy, F/U results  Unable to obtain EMB in office  Telehealth visit to discuss results following  If negative, repeat pelvic ultrasound in 4 weeks

## 2024-10-23 ENCOUNTER — OFFICE VISIT (OUTPATIENT)
Dept: GYNECOLOGIC ONCOLOGY | Facility: CLINIC | Age: 49
End: 2024-10-23
Payer: COMMERCIAL

## 2024-10-23 VITALS
SYSTOLIC BLOOD PRESSURE: 109 MMHG | OXYGEN SATURATION: 97 % | DIASTOLIC BLOOD PRESSURE: 68 MMHG | BODY MASS INDEX: 26.83 KG/M2 | HEART RATE: 81 BPM | RESPIRATION RATE: 16 BRPM | TEMPERATURE: 98.1 F | WEIGHT: 161.82 LBS

## 2024-10-23 DIAGNOSIS — Z12.31 SCREENING MAMMOGRAM, ENCOUNTER FOR: Primary | ICD-10-CM

## 2024-10-23 DIAGNOSIS — N88.8 CERVICAL MASS: ICD-10-CM

## 2024-10-23 DIAGNOSIS — R93.5 ABNORMAL CT SCAN, PELVIS: ICD-10-CM

## 2024-10-23 PROCEDURE — 99214 OFFICE O/P EST MOD 30 MIN: CPT | Performed by: NURSE PRACTITIONER

## 2024-10-23 PROCEDURE — 57505 ENDOCERVICAL CURETTAGE: CPT | Performed by: NURSE PRACTITIONER

## 2024-10-23 PROCEDURE — 57500 BIOPSY OF CERVIX: CPT | Performed by: NURSE PRACTITIONER

## 2024-10-23 ASSESSMENT — PAIN SCALES - GENERAL: PAINLEVEL_OUTOF10: 0-NO PAIN

## 2024-10-31 LAB
LABORATORY COMMENT REPORT: NORMAL
PATH REPORT.FINAL DX SPEC: NORMAL
PATH REPORT.GROSS SPEC: NORMAL
PATH REPORT.RELEVANT HX SPEC: NORMAL
PATH REPORT.TOTAL CANCER: NORMAL

## 2024-11-06 ENCOUNTER — APPOINTMENT (OUTPATIENT)
Dept: RADIOLOGY | Facility: HOSPITAL | Age: 49
End: 2024-11-06
Payer: COMMERCIAL

## 2024-11-12 ENCOUNTER — TELEMEDICINE (OUTPATIENT)
Dept: GYNECOLOGIC ONCOLOGY | Facility: CLINIC | Age: 49
End: 2024-11-12
Payer: COMMERCIAL

## 2024-11-12 DIAGNOSIS — R93.89 THICKENED ENDOMETRIUM: Primary | ICD-10-CM

## 2024-11-12 PROCEDURE — 99212 OFFICE O/P EST SF 10 MIN: CPT | Performed by: NURSE PRACTITIONER

## 2024-11-12 NOTE — PROGRESS NOTES
"Patient ID: Africa Gastelum \"Mckayla" is a 48 y.o. female.  Primary Care Provider: Divina Zepeda, APRN-CNP    Subjective    HPI:  Patient is a 48 year old female with thrombocytopenia referred to us for thickened endometrial lining and hypoechoic cervical lesion found on pelvic ultrasound.  Patient had a CT scan for thrombocytopenia which incidental finding of cervical mass was found, was then sent for pelvic ultrasound.      Medical History:  Hyperlipidemia  Iron deficiency-PO supplement    Surgical History:  Laminectomy   Spinal fusion   Tubal ligation      Knee surgery     Social History:  Cigarette smoker -1 pack a day  Occ ETOH  Denies drug use    Obstetrics/Gynecology History:        Family History:  Father-colon cancer with mets to lung    Screening  Mammogram   Colonoscopy   Pap 2024 Negative, HPV-      Objective    Telehealth visit to discuss results.     Interval history:  Patient is a 48 year old female with thickened endometrial lining on pelvic ultrasound and hypoechoic lesion on cervix.     CT A/P  24:  IMPRESSION:  1. Moderate centrilobular emphysematous changes. Peribronchial  thickening with diffuse upper lobe predominant centrilobular  micronodules, likely respiratory bronchiolitis.  2. Masslike thickening of the cervix is seen, further evaluation with  pelvic ultrasound and physical examination is recommended.  3. Multinodular goiter, to be correlated with thyroid ultrasound  2024, dictated separately.    Pelvic ultrasound 24;  IMPRESSION:  1. 1.4 cm hypoechoic area in the cervix with blood flow. Further  evaluation is suggested.  2. Nonvisualization of the left ovary, without definite left adnexal  mass..  3. Thickened endometrium. Clinical correlation and further evaluation  is suggested, as clinically warranted.    10/23/24:     A. ENDOCERVIX, CURETTINGS:   -- Endocervical and squamous mucosa with no pathologic diagnosis  B. CERVIX, 12 " O'CLOCK, BIOPSY:   -- Squamous mucosa with no pathologic diagnosis  -- Transformation zone is not identified    Denies any vaginal bleeding, pain or any new areas issues.     Assessment/Plan  Patient is a 48 year old female with thickened endometrial lining on pelvic ultrasound and hypoechoic lesion on cervix.   Negative pap and cervical biopsy.     PLAN: Pelvic ultrasound in 2 months and telehealth visit to discuss results.     I performed this visit using real-time telehealth tools, including an audio/video connection between the patient and myself. I spent 3 minutes virtually with this patient and/or family. More than 50% of the time was spent in counseling and/or coordination of care.

## 2024-12-02 ENCOUNTER — LAB (OUTPATIENT)
Dept: LAB | Facility: LAB | Age: 49
End: 2024-12-02
Payer: COMMERCIAL

## 2024-12-02 DIAGNOSIS — D69.6 LOW PLATELET COUNT (CMS-HCC): ICD-10-CM

## 2024-12-02 DIAGNOSIS — E55.9 VITAMIN D DEFICIENCY: ICD-10-CM

## 2024-12-02 DIAGNOSIS — E61.1 LOW IRON: ICD-10-CM

## 2024-12-02 DIAGNOSIS — E78.1 HYPERTRIGLYCERIDEMIA: ICD-10-CM

## 2024-12-02 DIAGNOSIS — E53.8 VITAMIN B12 DEFICIENCY: ICD-10-CM

## 2024-12-02 LAB
25(OH)D3 SERPL-MCNC: 44 NG/ML (ref 30–100)
ALBUMIN SERPL BCP-MCNC: 4 G/DL (ref 3.4–5)
ALP SERPL-CCNC: 62 U/L (ref 33–110)
ALT SERPL W P-5'-P-CCNC: 16 U/L (ref 7–45)
ANION GAP SERPL CALC-SCNC: 8 MMOL/L (ref 10–20)
AST SERPL W P-5'-P-CCNC: 13 U/L (ref 9–39)
BASOPHILS # BLD AUTO: 0.04 X10*3/UL (ref 0–0.1)
BASOPHILS NFR BLD AUTO: 0.7 %
BILIRUB SERPL-MCNC: 0.5 MG/DL (ref 0–1.2)
BUN SERPL-MCNC: 11 MG/DL (ref 6–23)
CALCIUM SERPL-MCNC: 10 MG/DL (ref 8.6–10.3)
CHLORIDE SERPL-SCNC: 102 MMOL/L (ref 98–107)
CHOLEST SERPL-MCNC: 217 MG/DL (ref 0–199)
CHOLESTEROL/HDL RATIO: 4.6
CO2 SERPL-SCNC: 32 MMOL/L (ref 21–32)
CREAT SERPL-MCNC: 0.99 MG/DL (ref 0.5–1.05)
EGFRCR SERPLBLD CKD-EPI 2021: 70 ML/MIN/1.73M*2
EOSINOPHIL # BLD AUTO: 0.06 X10*3/UL (ref 0–0.7)
EOSINOPHIL NFR BLD AUTO: 1 %
ERYTHROCYTE [DISTWIDTH] IN BLOOD BY AUTOMATED COUNT: 13.1 % (ref 11.5–14.5)
EST. AVERAGE GLUCOSE BLD GHB EST-MCNC: 91 MG/DL
GLUCOSE SERPL-MCNC: 89 MG/DL (ref 74–99)
HBA1C MFR BLD: 4.8 %
HCT VFR BLD AUTO: 47.7 % (ref 36–46)
HDLC SERPL-MCNC: 47 MG/DL
HGB BLD-MCNC: 15.6 G/DL (ref 12–16)
IMM GRANULOCYTES # BLD AUTO: 0.02 X10*3/UL (ref 0–0.7)
IMM GRANULOCYTES NFR BLD AUTO: 0.3 % (ref 0–0.9)
IRON SATN MFR SERPL: 32 % (ref 25–45)
IRON SERPL-MCNC: 94 UG/DL (ref 35–150)
LDLC SERPL CALC-MCNC: 157 MG/DL
LYMPHOCYTES # BLD AUTO: 1.47 X10*3/UL (ref 1.2–4.8)
LYMPHOCYTES NFR BLD AUTO: 24.2 %
MCH RBC QN AUTO: 30.5 PG (ref 26–34)
MCHC RBC AUTO-ENTMCNC: 32.7 G/DL (ref 32–36)
MCV RBC AUTO: 93 FL (ref 80–100)
MONOCYTES # BLD AUTO: 0.35 X10*3/UL (ref 0.1–1)
MONOCYTES NFR BLD AUTO: 5.8 %
NEUTROPHILS # BLD AUTO: 4.13 X10*3/UL (ref 1.2–7.7)
NEUTROPHILS NFR BLD AUTO: 68 %
NON HDL CHOLESTEROL: 170 MG/DL (ref 0–149)
NRBC BLD-RTO: 0 /100 WBCS (ref 0–0)
PLATELET # BLD AUTO: 157 X10*3/UL (ref 150–450)
POTASSIUM SERPL-SCNC: 4.2 MMOL/L (ref 3.5–5.3)
PROT SERPL-MCNC: 6.4 G/DL (ref 6.4–8.2)
PTH-INTACT SERPL-MCNC: 36.3 PG/ML (ref 18.5–88)
RBC # BLD AUTO: 5.11 X10*6/UL (ref 4–5.2)
SODIUM SERPL-SCNC: 138 MMOL/L (ref 136–145)
TIBC SERPL-MCNC: 296 UG/DL (ref 240–445)
TRIGL SERPL-MCNC: 67 MG/DL (ref 0–149)
TSH SERPL-ACNC: 1.19 MIU/L (ref 0.44–3.98)
UIBC SERPL-MCNC: 202 UG/DL (ref 110–370)
VIT B12 SERPL-MCNC: 475 PG/ML (ref 211–911)
VLDL: 13 MG/DL (ref 0–40)
WBC # BLD AUTO: 6.1 X10*3/UL (ref 4.4–11.3)

## 2024-12-02 PROCEDURE — 36415 COLL VENOUS BLD VENIPUNCTURE: CPT

## 2024-12-02 PROCEDURE — 83970 ASSAY OF PARATHORMONE: CPT

## 2024-12-02 PROCEDURE — 83550 IRON BINDING TEST: CPT

## 2024-12-02 PROCEDURE — 83540 ASSAY OF IRON: CPT

## 2024-12-02 PROCEDURE — 85025 COMPLETE CBC W/AUTO DIFF WBC: CPT

## 2024-12-02 PROCEDURE — 82306 VITAMIN D 25 HYDROXY: CPT

## 2024-12-02 PROCEDURE — 80053 COMPREHEN METABOLIC PANEL: CPT

## 2024-12-02 PROCEDURE — 83036 HEMOGLOBIN GLYCOSYLATED A1C: CPT

## 2024-12-02 PROCEDURE — 82607 VITAMIN B-12: CPT

## 2024-12-02 PROCEDURE — 80061 LIPID PANEL: CPT

## 2024-12-02 PROCEDURE — 84443 ASSAY THYROID STIM HORMONE: CPT

## 2024-12-03 ENCOUNTER — APPOINTMENT (OUTPATIENT)
Dept: PRIMARY CARE | Facility: CLINIC | Age: 49
End: 2024-12-03
Payer: COMMERCIAL

## 2024-12-05 ENCOUNTER — APPOINTMENT (OUTPATIENT)
Dept: PRIMARY CARE | Facility: CLINIC | Age: 49
End: 2024-12-05
Payer: COMMERCIAL

## 2024-12-11 DIAGNOSIS — F17.200 NICOTINE DEPENDENCE, UNSPECIFIED, UNCOMPLICATED: ICD-10-CM

## 2024-12-11 DIAGNOSIS — Z87.898 PERSONAL HISTORY OF OTHER SPECIFIED CONDITIONS: ICD-10-CM

## 2024-12-11 DIAGNOSIS — G43.819 OTHER MIGRAINE WITHOUT STATUS MIGRAINOSUS, INTRACTABLE: ICD-10-CM

## 2024-12-12 DIAGNOSIS — K21.9 GASTRO-ESOPHAGEAL REFLUX DISEASE WITHOUT ESOPHAGITIS: ICD-10-CM

## 2024-12-12 RX ORDER — OMEPRAZOLE 40 MG/1
40 CAPSULE, DELAYED RELEASE ORAL DAILY
Qty: 90 CAPSULE | Refills: 3 | Status: SHIPPED | OUTPATIENT
Start: 2024-12-12

## 2024-12-12 RX ORDER — MECLIZINE HYDROCHLORIDE 25 MG/1
TABLET ORAL
Qty: 90 TABLET | Refills: 1 | Status: SHIPPED | OUTPATIENT
Start: 2024-12-12

## 2024-12-12 RX ORDER — AMITRIPTYLINE HYDROCHLORIDE 50 MG/1
50 TABLET, FILM COATED ORAL NIGHTLY
Qty: 90 TABLET | Refills: 3 | Status: SHIPPED | OUTPATIENT
Start: 2024-12-12

## 2024-12-12 RX ORDER — BUPROPION HYDROCHLORIDE 150 MG/1
TABLET, FILM COATED, EXTENDED RELEASE ORAL
Qty: 60 TABLET | Refills: 5 | Status: SHIPPED | OUTPATIENT
Start: 2024-12-12

## 2024-12-17 ENCOUNTER — APPOINTMENT (OUTPATIENT)
Dept: PRIMARY CARE | Facility: CLINIC | Age: 49
End: 2024-12-17
Payer: COMMERCIAL

## 2024-12-17 ENCOUNTER — OFFICE VISIT (OUTPATIENT)
Dept: PRIMARY CARE | Facility: CLINIC | Age: 49
End: 2024-12-17
Payer: COMMERCIAL

## 2024-12-17 VITALS
HEART RATE: 83 BPM | DIASTOLIC BLOOD PRESSURE: 84 MMHG | SYSTOLIC BLOOD PRESSURE: 126 MMHG | BODY MASS INDEX: 27.59 KG/M2 | HEIGHT: 65 IN | WEIGHT: 165.6 LBS

## 2024-12-17 DIAGNOSIS — E61.1 LOW IRON: ICD-10-CM

## 2024-12-17 DIAGNOSIS — R73.01 IFG (IMPAIRED FASTING GLUCOSE): ICD-10-CM

## 2024-12-17 DIAGNOSIS — K21.9 GASTRO-ESOPHAGEAL REFLUX DISEASE WITHOUT ESOPHAGITIS: ICD-10-CM

## 2024-12-17 DIAGNOSIS — F60.3 BORDERLINE PERSONALITY DISORDER (MULTI): Primary | ICD-10-CM

## 2024-12-17 DIAGNOSIS — E53.8 VITAMIN B12 DEFICIENCY: ICD-10-CM

## 2024-12-17 DIAGNOSIS — E53.8 FOLATE DEFICIENCY: ICD-10-CM

## 2024-12-17 DIAGNOSIS — E78.00 HYPERCHOLESTEROLEMIA: ICD-10-CM

## 2024-12-17 DIAGNOSIS — Z12.31 BREAST CANCER SCREENING BY MAMMOGRAM: ICD-10-CM

## 2024-12-17 DIAGNOSIS — E55.9 VITAMIN D DEFICIENCY: ICD-10-CM

## 2024-12-17 PROCEDURE — 4004F PT TOBACCO SCREEN RCVD TLK: CPT | Performed by: NURSE PRACTITIONER

## 2024-12-17 PROCEDURE — 3008F BODY MASS INDEX DOCD: CPT | Performed by: NURSE PRACTITIONER

## 2024-12-17 PROCEDURE — 99214 OFFICE O/P EST MOD 30 MIN: CPT | Performed by: NURSE PRACTITIONER

## 2024-12-17 RX ORDER — OMEPRAZOLE 40 MG/1
40 CAPSULE, DELAYED RELEASE ORAL DAILY
Qty: 90 CAPSULE | Refills: 3 | Status: SHIPPED | OUTPATIENT
Start: 2024-12-17

## 2024-12-17 RX ORDER — ATORVASTATIN CALCIUM 40 MG/1
40 TABLET, FILM COATED ORAL NIGHTLY
Qty: 90 TABLET | Refills: 3 | Status: SHIPPED | OUTPATIENT
Start: 2024-12-17

## 2024-12-17 RX ORDER — ATORVASTATIN CALCIUM 20 MG/1
20 TABLET, FILM COATED ORAL NIGHTLY
Qty: 90 TABLET | Refills: 3 | Status: SHIPPED | OUTPATIENT
Start: 2024-12-17 | End: 2025-12-17

## 2024-12-17 ASSESSMENT — ENCOUNTER SYMPTOMS
ABDOMINAL DISTENTION: 0
PALPITATIONS: 0
VOMITING: 0
BLOOD IN STOOL: 0
HEADACHES: 0
HEMATURIA: 0
CONFUSION: 0
WHEEZING: 0
FATIGUE: 0
WEAKNESS: 0
SLEEP DISTURBANCE: 0
TROUBLE SWALLOWING: 0
FREQUENCY: 0
MYALGIAS: 0
BACK PAIN: 0
DIFFICULTY URINATING: 0
JOINT SWELLING: 0
FEVER: 0
APNEA: 0
EYE PAIN: 0
NECK PAIN: 0
DIARRHEA: 0
SHORTNESS OF BREATH: 0
COUGH: 0
CHEST TIGHTNESS: 0
DIZZINESS: 0
EYE REDNESS: 0
SPEECH DIFFICULTY: 0
NERVOUS/ANXIOUS: 0
CONSTIPATION: 0
ABDOMINAL PAIN: 0
ARTHRALGIAS: 0
DYSURIA: 0
NUMBNESS: 0
SEIZURES: 0
PHOTOPHOBIA: 0
FACIAL ASYMMETRY: 0
UNEXPECTED WEIGHT CHANGE: 0
WOUND: 0
CHILLS: 0
NAUSEA: 0
CHOKING: 0
SORE THROAT: 0
FLANK PAIN: 0

## 2024-12-17 ASSESSMENT — PATIENT HEALTH QUESTIONNAIRE - PHQ9
SUM OF ALL RESPONSES TO PHQ9 QUESTIONS 1 AND 2: 2
2. FEELING DOWN, DEPRESSED OR HOPELESS: MORE THAN HALF THE DAYS
1. LITTLE INTEREST OR PLEASURE IN DOING THINGS: NOT AT ALL
10. IF YOU CHECKED OFF ANY PROBLEMS, HOW DIFFICULT HAVE THESE PROBLEMS MADE IT FOR YOU TO DO YOUR WORK, TAKE CARE OF THINGS AT HOME, OR GET ALONG WITH OTHER PEOPLE: NOT DIFFICULT AT ALL

## 2024-12-17 NOTE — PROGRESS NOTES
"Subjective   Patient ID: Africa Gastelum \"Mckayla" is a 49 y.o. female who presents for Follow-up (6 MONTH F/U, LABS).    HPI:  Presents today for 6 MONTH LAB FU. NO NEW COMPLAINTS      LIPIDS- INCREASE  ATORVASTATIN TO 60 MG FROM 40 MG DAILY.  DIET MODIFICATIONS DISCUSSED. CT CALCIUM SCORE TEST ORDER   B12- STABLE  FOLIC- STABLE  VIT D- STABLE  IFG- STABLE  GERD-  REFUSING EGD AT THIS TIME. DENIES ANY COMPLICATIONS   MAMMO- WILL ORDER SCREEN       Visit Vitals  /84   Pulse 83   Ht 1.651 m (5' 5\")   Wt 75.1 kg (165 lb 9.6 oz)   BMI 27.56 kg/m²   OB Status Having periods   Smoking Status Every Day   BSA 1.86 m²        Review of Systems   Constitutional:  Negative for chills, fatigue, fever and unexpected weight change.   HENT:  Negative for congestion, ear pain, sore throat and trouble swallowing.    Eyes:  Negative for photophobia, pain, redness and visual disturbance.   Respiratory:  Negative for apnea, cough, choking, chest tightness, shortness of breath and wheezing.    Cardiovascular:  Negative for chest pain, palpitations and leg swelling.   Gastrointestinal:  Negative for abdominal distention, abdominal pain, blood in stool, constipation, diarrhea, nausea and vomiting.   Genitourinary:  Negative for difficulty urinating, dysuria, flank pain, frequency, hematuria and urgency.   Musculoskeletal:  Negative for arthralgias, back pain, gait problem, joint swelling, myalgias and neck pain.   Skin:  Negative for rash and wound.   Neurological:  Negative for dizziness, seizures, syncope, facial asymmetry, speech difficulty, weakness, numbness and headaches.   Psychiatric/Behavioral:  Negative for confusion, sleep disturbance and suicidal ideas. The patient is not nervous/anxious.        Objective   Component      Latest Ref Rng 12/2/2024   WBC      4.4 - 11.3 x10*3/uL 6.1    nRBC      0.0 - 0.0 /100 WBCs 0.0    RBC      4.00 - 5.20 x10*6/uL 5.11    HEMOGLOBIN      12.0 - 16.0 g/dL 15.6    HEMATOCRIT      36.0 - " 46.0 % 47.7 (H)    MCV      80 - 100 fL 93    MCH      26.0 - 34.0 pg 30.5    MCHC      32.0 - 36.0 g/dL 32.7    RED CELL DISTRIBUTION WIDTH      11.5 - 14.5 % 13.1    Platelets      150 - 450 x10*3/uL 157    Neutrophils %      40.0 - 80.0 % 68.0    Immature Granulocytes %, Automated      0.0 - 0.9 % 0.3    Lymphocytes %      13.0 - 44.0 % 24.2    Monocytes %      2.0 - 10.0 % 5.8    Eosinophils %      0.0 - 6.0 % 1.0    Basophils %      0.0 - 2.0 % 0.7    Neutrophils Absolute      1.20 - 7.70 x10*3/uL 4.13    Immature Granulocytes Absolute, Automated      0.00 - 0.70 x10*3/uL 0.02    Lymphocytes Absolute      1.20 - 4.80 x10*3/uL 1.47    Monocytes Absolute      0.10 - 1.00 x10*3/uL 0.35    Eosinophils Absolute      0.00 - 0.70 x10*3/uL 0.06    Basophils Absolute      0.00 - 0.10 x10*3/uL 0.04    GLUCOSE      74 - 99 mg/dL 89    SODIUM      136 - 145 mmol/L 138    POTASSIUM      3.5 - 5.3 mmol/L 4.2    CHLORIDE      98 - 107 mmol/L 102    Bicarbonate      21 - 32 mmol/L 32    Anion Gap      10 - 20 mmol/L 8 (L)    Blood Urea Nitrogen      6 - 23 mg/dL 11    Creatinine      0.50 - 1.05 mg/dL 0.99    EGFR      >60 mL/min/1.73m*2 70    Calcium      8.6 - 10.3 mg/dL 10.0    Albumin      3.4 - 5.0 g/dL 4.0    Alkaline Phosphatase      33 - 110 U/L 62    Total Protein      6.4 - 8.2 g/dL 6.4    AST      9 - 39 U/L 13    Bilirubin Total      0.0 - 1.2 mg/dL 0.5    ALT      7 - 45 U/L 16    CHOLESTEROL      0 - 199 mg/dL 217 (H)    HDL CHOLESTEROL      mg/dL 47.0    Cholesterol/HDL Ratio 4.6    LDL Calculated      <=99 mg/dL 157 (H)    VLDL      0 - 40 mg/dL 13    TRIGLYCERIDES      0 - 149 mg/dL 67    Non HDL Cholesterol      0 - 149 mg/dL 170 (H)    IRON      35 - 150 ug/dL 94    UIBC      110 - 370 ug/dL 202    TIBC      240 - 445 ug/dL 296    % Saturation      25 - 45 % 32    Hemoglobin A1C      See comment % 4.8    Estimated Average Glucose      Not Established mg/dL 91    Thyroid Stimulating Hormone      0.44 - 3.98  mIU/L 1.19    Vitamin D, 25-Hydroxy, Total      30 - 100 ng/mL 44    Vitamin B12      211 - 911 pg/mL 475    Parathyroid Hormone, Intact      18.5 - 88.0 pg/mL 36.3       Legend:  (H) High  (L) Low  Physical Exam  Constitutional:       Appearance: Normal appearance. She is normal weight.   HENT:      Head: Normocephalic.   Eyes:      Extraocular Movements: Extraocular movements intact.      Conjunctiva/sclera: Conjunctivae normal.      Pupils: Pupils are equal, round, and reactive to light.   Cardiovascular:      Rate and Rhythm: Normal rate and regular rhythm.      Pulses: Normal pulses.      Heart sounds: Normal heart sounds.   Pulmonary:      Effort: Pulmonary effort is normal.      Breath sounds: Normal breath sounds.   Musculoskeletal:         General: Normal range of motion.      Cervical back: Normal range of motion.   Skin:     General: Skin is warm and dry.   Neurological:      General: No focal deficit present.      Mental Status: She is alert and oriented to person, place, and time.   Psychiatric:         Mood and Affect: Mood normal.         Behavior: Behavior normal.         Thought Content: Thought content normal.         Judgment: Judgment normal.            Assessment/Plan   Problem List Items Addressed This Visit       Borderline personality disorder (Multi) - Primary    Folate deficiency    Relevant Orders    Folate    Hypercholesterolemia    Relevant Medications    atorvastatin (Lipitor) 20 mg tablet    atorvastatin (Lipitor) 40 mg tablet    Other Relevant Orders    CBC and Auto Differential    Comprehensive Metabolic Panel    TSH with reflex to Free T4 if abnormal    CT cardiac scoring wo IV contrast    Low iron    Relevant Orders    Iron and TIBC    Ferritin    Vitamin B12 deficiency    Relevant Orders    Vitamin B12    Vitamin D deficiency    Relevant Orders    Vitamin D 25-Hydroxy,Total (for eval of Vitamin D levels)    Parathyroid Hormone, Intact     Other Visit Diagnoses       IFG (impaired  fasting glucose)        Relevant Orders    Hemoglobin A1C    Gastro-esophageal reflux disease without esophagitis        Relevant Medications    omeprazole (PriLOSEC) 40 mg DR capsule    Breast cancer screening by mammogram        Relevant Orders    BI mammo bilateral screening tomosynthesis          WE DISCUSSED MOST COMMON SIDE EFFECTS OF PRESCRIBED MEDICATIONS. INDICATIONS, RISK, COMPLICATIONS, AND ALTERNATIVES OF MEDICATION/THERAPEUTICS WERE EXPLAINED AND DISCUSSED. PLEASE MONITOR CLOSELY FOR ANY UNTOWARD SIDE EFFECTS OR COMPLICATIONS OF MEDICATIONS. PATIENT IS STRONGLY ADVISED TO BE COMPLIANT WITH RECOMMENDATIONS. QUESTIONS AND CONCERNS WERE ADDRESSED. INSTRUCTED TO CALL, RETURN SOONER, OR GO TO THE ER,  IF SYMPTOMS PERSIST OR WORSEN. THEY VOICED UNDERSTANDING AND  DENIES FURTHER QUESTIONS AT THIS TIME.    TIME CODE  1. PREPARATION FOR PATIENT'S VISIT (REVIEWING CHART, CURRENT MEDICAL RECORDS, OUTSIDE HEALTH PROVIDER RECORDS, PREVIOUS HISTORY, EXAM, TEST, PROCEDURE, AND MEDICATIONS)  2. FACE TO FACE ENCOUNTER OBTAINING HISTORY FROM THE PATIENT/FAMILY/CAREGIVERS; PERFORMING EVALUATION AND EXAMINATION; ORDERING TESTS OR PROCEDURES; REFERRING AND COMMUNICATING WITH OTHER HEALTHCARE PROVIDERS; COUNSELING AND EDUCATION OF THE PATIENT/FAMILY/CAREGIVERS; INDEPENDENTLY INTERPRETING RESULTS (TESTS, LABS, PROCEDURES, IMAGING) AND COMMUNICATING AND EXPLAINING RESULTS TO THE PATIENT/FAMILY/CAREGIVERS  3. COORDINATION OF CARE; PREPARING AND PRINTING DISCHARGE INSTRUCTIONS AND ANY EDUCATIONAL MATERIAL FOR THE PATIENT/FAMILY/CAREGIVERS. DOCUMENTING CLINICAL INFORMATION IN THE ELECTRONIC MEDICAL RECORD   4. REVIEWING OARRS AS NEEDED    MDM  1) COMPLEXITY: MORE THAN 1 STABLE CHRONIC CONDITION ADDRESSED OR 1 ACUTE ILLNESS ADDRESSED   2)DATA: TESTS INTERPRETED AND OR ORDERED, TOOK INDEPENDENT HISTORY OR RECORDS REVIEWED  3)RISK: MODERATE RISK DUE TO NATURE OF MEDICAL CONDITIONS/COMORBIDITY OR MEDICATIONS ORDERED OR SURGICAL OR  PROCEDURE REFERRAL    6 months with labs

## 2025-01-10 ENCOUNTER — APPOINTMENT (OUTPATIENT)
Dept: OBSTETRICS AND GYNECOLOGY | Facility: CLINIC | Age: 50
End: 2025-01-10
Payer: COMMERCIAL

## 2025-01-10 ENCOUNTER — APPOINTMENT (OUTPATIENT)
Dept: GYNECOLOGIC ONCOLOGY | Facility: CLINIC | Age: 50
End: 2025-01-10
Payer: COMMERCIAL

## 2025-01-13 ENCOUNTER — APPOINTMENT (OUTPATIENT)
Dept: RADIOLOGY | Facility: HOSPITAL | Age: 50
End: 2025-01-13
Payer: COMMERCIAL

## 2025-01-14 ENCOUNTER — APPOINTMENT (OUTPATIENT)
Dept: OBSTETRICS AND GYNECOLOGY | Facility: CLINIC | Age: 50
End: 2025-01-14
Payer: COMMERCIAL

## 2025-01-14 ENCOUNTER — APPOINTMENT (OUTPATIENT)
Dept: HEMATOLOGY/ONCOLOGY | Facility: CLINIC | Age: 50
End: 2025-01-14
Payer: COMMERCIAL

## 2025-01-16 ENCOUNTER — APPOINTMENT (OUTPATIENT)
Dept: HEMATOLOGY/ONCOLOGY | Facility: CLINIC | Age: 50
End: 2025-01-16
Payer: COMMERCIAL

## 2025-01-21 ENCOUNTER — APPOINTMENT (OUTPATIENT)
Dept: RADIOLOGY | Facility: HOSPITAL | Age: 50
End: 2025-01-21
Payer: COMMERCIAL

## 2025-06-17 ENCOUNTER — APPOINTMENT (OUTPATIENT)
Dept: PRIMARY CARE | Facility: CLINIC | Age: 50
End: 2025-06-17
Payer: COMMERCIAL